# Patient Record
Sex: FEMALE | Employment: FULL TIME
[De-identification: names, ages, dates, MRNs, and addresses within clinical notes are randomized per-mention and may not be internally consistent; named-entity substitution may affect disease eponyms.]

---

## 2014-07-01 LAB — COLONOSCOPY STUDY: NORMAL

## 2017-10-03 ENCOUNTER — IMAGING SERVICES (OUTPATIENT)
Dept: OTHER | Age: 57
End: 2017-10-03

## 2017-10-03 ENCOUNTER — HOSPITAL (OUTPATIENT)
Dept: OTHER | Age: 57
End: 2017-10-03
Attending: FAMILY MEDICINE

## 2017-11-10 ENCOUNTER — IMAGING SERVICES (OUTPATIENT)
Dept: OTHER | Age: 57
End: 2017-11-10

## 2017-11-10 ENCOUNTER — HOSPITAL (OUTPATIENT)
Dept: OTHER | Age: 57
End: 2017-11-10
Attending: FAMILY MEDICINE

## 2017-12-01 ENCOUNTER — HOSPITAL (OUTPATIENT)
Dept: OTHER | Age: 57
End: 2017-12-01
Attending: FAMILY MEDICINE

## 2017-12-11 ENCOUNTER — IMAGING SERVICES (OUTPATIENT)
Dept: OTHER | Age: 57
End: 2017-12-11

## 2017-12-11 ENCOUNTER — HOSPITAL (OUTPATIENT)
Dept: OTHER | Age: 57
End: 2017-12-11
Attending: FAMILY MEDICINE

## 2018-01-15 ENCOUNTER — HOSPITAL ENCOUNTER (OUTPATIENT)
Dept: ULTRASOUND IMAGING | Age: 58
Discharge: HOME | End: 2018-01-15
Attending: OBSTETRICS & GYNECOLOGY
Payer: COMMERCIAL

## 2018-01-15 ENCOUNTER — OFFICE VISIT (OUTPATIENT)
Dept: OBGYN CLINIC | Facility: CLINIC | Age: 58
End: 2018-01-15

## 2018-01-15 ENCOUNTER — LAB ENCOUNTER (OUTPATIENT)
Dept: LAB | Facility: HOSPITAL | Age: 58
End: 2018-01-15
Attending: OBSTETRICS & GYNECOLOGY
Payer: COMMERCIAL

## 2018-01-15 VITALS
DIASTOLIC BLOOD PRESSURE: 70 MMHG | WEIGHT: 145.5 LBS | SYSTOLIC BLOOD PRESSURE: 124 MMHG | HEIGHT: 65 IN | BODY MASS INDEX: 24.24 KG/M2

## 2018-01-15 DIAGNOSIS — N92.1 MENORRHAGIA WITH IRREGULAR CYCLE: ICD-10-CM

## 2018-01-15 DIAGNOSIS — D25.9 UTERINE LEIOMYOMA, UNSPECIFIED LOCATION: ICD-10-CM

## 2018-01-15 DIAGNOSIS — N92.6 IRREGULAR MENSES: ICD-10-CM

## 2018-01-15 DIAGNOSIS — N92.1 MENORRHAGIA WITH IRREGULAR CYCLE: Primary | ICD-10-CM

## 2018-01-15 LAB
BASOPHILS # BLD: 0.1 K/UL (ref 0–0.2)
BASOPHILS NFR BLD: 1 %
EOSINOPHIL # BLD: 0.1 K/UL (ref 0–0.7)
EOSINOPHIL NFR BLD: 1 %
ERYTHROCYTE [DISTWIDTH] IN BLOOD BY AUTOMATED COUNT: 13.4 % (ref 11–15)
HCT VFR BLD AUTO: 37.6 % (ref 35–48)
HGB BLD-MCNC: 12.6 G/DL (ref 12–16)
LYMPHOCYTES # BLD: 1.5 K/UL (ref 1–4)
LYMPHOCYTES NFR BLD: 19 %
MCH RBC QN AUTO: 31.2 PG (ref 27–32)
MCHC RBC AUTO-ENTMCNC: 33.6 G/DL (ref 32–37)
MCV RBC AUTO: 92.8 FL (ref 80–100)
MONOCYTES # BLD: 0.5 K/UL (ref 0–1)
MONOCYTES NFR BLD: 6 %
NEUTROPHILS # BLD AUTO: 5.6 K/UL (ref 1.8–7.7)
NEUTROPHILS NFR BLD: 72 %
PLATELET # BLD AUTO: 344 K/UL (ref 140–400)
PMV BLD AUTO: 8.4 FL (ref 7.4–10.3)
RBC # BLD AUTO: 4.05 M/UL (ref 3.7–5.4)
WBC # BLD AUTO: 7.8 K/UL (ref 4–11)

## 2018-01-15 PROCEDURE — 76856 US EXAM PELVIC COMPLETE: CPT | Performed by: OBSTETRICS & GYNECOLOGY

## 2018-01-15 PROCEDURE — 76830 TRANSVAGINAL US NON-OB: CPT | Performed by: OBSTETRICS & GYNECOLOGY

## 2018-01-15 PROCEDURE — 36415 COLL VENOUS BLD VENIPUNCTURE: CPT

## 2018-01-15 PROCEDURE — 85025 COMPLETE CBC W/AUTO DIFF WBC: CPT

## 2018-01-15 PROCEDURE — 99244 OFF/OP CNSLTJ NEW/EST MOD 40: CPT | Performed by: OBSTETRICS & GYNECOLOGY

## 2018-01-15 RX ORDER — MEDROXYPROGESTERONE ACETATE 10 MG/1
TABLET ORAL
Refills: 0 | COMMUNITY
Start: 2018-01-10 | End: 2018-02-01

## 2018-01-15 RX ORDER — MEDROXYPROGESTERONE ACETATE 10 MG/1
10 TABLET ORAL DAILY
Qty: 30 TABLET | Refills: 1 | Status: SHIPPED | OUTPATIENT
Start: 2018-01-15 | End: 2018-02-07

## 2018-01-15 NOTE — PROGRESS NOTES
HPI:   Alissa Perez is a 56year old female who presents for a hx of 3 weeks of heavy menses,  Had a 2 week episode in November also. Pt called her pcp over the weekend,  Pt had cbc hb 13 last Wednesday.   Pt counseled on w/u/eval/tx of heavy menses Preston Walker is a 56year old female who presents for a consult for hx of heavy menstrual bleeding for 3 weeks,  Had a 2 week episode in November 2017. Pt counseled extensively on possible etiology/w/u/eval/tx , all questions answered.    Pt in exa

## 2018-01-16 ENCOUNTER — TELEPHONE (OUTPATIENT)
Dept: PEDIATRICS CLINIC | Facility: CLINIC | Age: 58
End: 2018-01-16

## 2018-01-16 DIAGNOSIS — N92.6 IRREGULAR MENSTRUAL CYCLE: ICD-10-CM

## 2018-01-16 DIAGNOSIS — N92.1 METRORRHAGIA: ICD-10-CM

## 2018-01-16 DIAGNOSIS — D25.9 UTERINE LEIOMYOMA, UNSPECIFIED LOCATION: Primary | ICD-10-CM

## 2018-01-17 NOTE — TELEPHONE ENCOUNTER
Pt called, requested to speak with Dr. Deejay Hurley nurse. Attempted to transfer call to appropriate office, pt. Given phone number to [facility] office.

## 2018-01-17 NOTE — TELEPHONE ENCOUNTER
Called pt , left message. Pelvic u/s shows 3 fibroids, possible 1 subendometrial fibroid. Pt needs counseling, route to me when she calls back. Will offer Hysterscopy/ myosure myomectomy/d & c. Would consider mirena iud insertion as well if pt desires to help suppress fibroid regrowth.

## 2018-01-17 NOTE — TELEPHONE ENCOUNTER
Pt requesting result, informed of result per ASJ note, pt would like to speak to ASJ.   Can call to [phone] per pt

## 2018-01-18 NOTE — TELEPHONE ENCOUNTER
Pt counseled extensively and pt requested an operative hysteroscopy/myosure myomectomy/d & c/ mirena insertion.   Pt is requesting feb 16 in the am,  Please see if a 730 am is available and call pt tomorrow. e

## 2018-01-19 NOTE — TELEPHONE ENCOUNTER
Patient informed and verbalized understanding of date and time.  Pt is wondering if it needs to be moved up sooner since she is still bleeding, currently taking 20mg of provera a day and still changing 4 pads a day, please advise

## 2018-01-22 NOTE — TELEPHONE ENCOUNTER
Moved it to 2/7 @ 10:15, confirmed with Chanel Bronson (VAN). Dr. Darrion Last, you will be following your self on two cases that morning.   Will a surgical assist work in this case Dr. Darrion Last?

## 2018-01-26 ENCOUNTER — TELEPHONE (OUTPATIENT)
Dept: OBGYN CLINIC | Facility: CLINIC | Age: 58
End: 2018-01-26

## 2018-01-26 NOTE — TELEPHONE ENCOUNTER
Pt informed to keep taking progesterone until surgery to prevent a withdrawal bleed or at least until dr. Ish Larry states a day she should stop taking it.  Dr. Ish Larry please advise

## 2018-01-26 NOTE — TELEPHONE ENCOUNTER
Pt was given medroxyPROGESTERone, would like to know if she should continue taking it? Bleeding stopped. If so, for how long?

## 2018-02-01 RX ORDER — PNV NO.95/FERROUS FUM/FOLIC AC 28MG-0.8MG
TABLET ORAL
COMMUNITY
End: 2018-02-07

## 2018-02-06 ENCOUNTER — TELEPHONE (OUTPATIENT)
Dept: PEDIATRICS CLINIC | Facility: CLINIC | Age: 58
End: 2018-02-06

## 2018-02-06 NOTE — TELEPHONE ENCOUNTER
Pt scheduled for surgery with ASJ  Tomorrow and wanted ASJ to be in formed she began having cold like symptoms on Sunday. Today just having runny/stuffy nose. Denied any body aches, cough or fever.  Would like to know if this would be on problem for surgery

## 2018-02-07 ENCOUNTER — HOSPITAL ENCOUNTER (OUTPATIENT)
Facility: HOSPITAL | Age: 58
Setting detail: HOSPITAL OUTPATIENT SURGERY
Discharge: HOME | End: 2018-02-07
Attending: OBSTETRICS & GYNECOLOGY | Admitting: OBSTETRICS & GYNECOLOGY
Payer: COMMERCIAL

## 2018-02-07 ENCOUNTER — ANESTHESIA EVENT (OUTPATIENT)
Dept: SURGERY | Facility: HOSPITAL | Age: 58
End: 2018-02-07

## 2018-02-07 ENCOUNTER — SURGERY (OUTPATIENT)
Age: 58
End: 2018-02-07

## 2018-02-07 ENCOUNTER — ANESTHESIA (OUTPATIENT)
Dept: SURGERY | Facility: HOSPITAL | Age: 58
End: 2018-02-07

## 2018-02-07 VITALS
OXYGEN SATURATION: 100 % | HEIGHT: 65.5 IN | SYSTOLIC BLOOD PRESSURE: 133 MMHG | TEMPERATURE: 98 F | HEART RATE: 65 BPM | WEIGHT: 144 LBS | RESPIRATION RATE: 16 BRPM | BODY MASS INDEX: 23.7 KG/M2 | DIASTOLIC BLOOD PRESSURE: 76 MMHG

## 2018-02-07 DIAGNOSIS — D25.9 UTERINE LEIOMYOMA, UNSPECIFIED LOCATION: ICD-10-CM

## 2018-02-07 DIAGNOSIS — N92.6 IRREGULAR MENSTRUAL CYCLE: ICD-10-CM

## 2018-02-07 DIAGNOSIS — N92.1 METRORRHAGIA: ICD-10-CM

## 2018-02-07 LAB — B-HCG UR QL: NEGATIVE

## 2018-02-07 PROCEDURE — 0UDB8ZX EXTRACTION OF ENDOMETRIUM, VIA NATURAL OR ARTIFICIAL OPENING ENDOSCOPIC, DIAGNOSTIC: ICD-10-PCS | Performed by: OBSTETRICS & GYNECOLOGY

## 2018-02-07 PROCEDURE — 58120 DILATION AND CURETTAGE: CPT | Performed by: OBSTETRICS & GYNECOLOGY

## 2018-02-07 PROCEDURE — 0UH98HZ INSERTION OF CONTRACEPTIVE DEVICE INTO UTERUS, VIA NATURAL OR ARTIFICIAL OPENING ENDOSCOPIC: ICD-10-PCS | Performed by: OBSTETRICS & GYNECOLOGY

## 2018-02-07 PROCEDURE — 0UB98ZZ EXCISION OF UTERUS, VIA NATURAL OR ARTIFICIAL OPENING ENDOSCOPIC: ICD-10-PCS | Performed by: OBSTETRICS & GYNECOLOGY

## 2018-02-07 PROCEDURE — 99235 HOSP IP/OBS SAME DATE MOD 70: CPT | Performed by: OBSTETRICS & GYNECOLOGY

## 2018-02-07 PROCEDURE — 58558 HYSTEROSCOPY BIOPSY: CPT | Performed by: OBSTETRICS & GYNECOLOGY

## 2018-02-07 PROCEDURE — 58300 INSERT INTRAUTERINE DEVICE: CPT | Performed by: OBSTETRICS & GYNECOLOGY

## 2018-02-07 DEVICE — MIRENA IUD 52MG: Type: IMPLANTABLE DEVICE | Status: FUNCTIONAL

## 2018-02-07 RX ORDER — HYDROCODONE BITARTRATE AND ACETAMINOPHEN 5; 325 MG/1; MG/1
1 TABLET ORAL AS NEEDED
Status: DISCONTINUED | OUTPATIENT
Start: 2018-02-07 | End: 2018-02-07

## 2018-02-07 RX ORDER — LIDOCAINE HYDROCHLORIDE 10 MG/ML
INJECTION, SOLUTION EPIDURAL; INFILTRATION; INTRACAUDAL; PERINEURAL AS NEEDED
Status: DISCONTINUED | OUTPATIENT
Start: 2018-02-07 | End: 2018-02-07 | Stop reason: SURG

## 2018-02-07 RX ORDER — MIDAZOLAM HYDROCHLORIDE 1 MG/ML
INJECTION INTRAMUSCULAR; INTRAVENOUS AS NEEDED
Status: DISCONTINUED | OUTPATIENT
Start: 2018-02-07 | End: 2018-02-07 | Stop reason: SURG

## 2018-02-07 RX ORDER — ACETAMINOPHEN 500 MG
1000 TABLET ORAL ONCE
Status: COMPLETED | OUTPATIENT
Start: 2018-02-07 | End: 2018-02-07

## 2018-02-07 RX ORDER — ONDANSETRON 2 MG/ML
INJECTION INTRAMUSCULAR; INTRAVENOUS AS NEEDED
Status: DISCONTINUED | OUTPATIENT
Start: 2018-02-07 | End: 2018-02-07 | Stop reason: SURG

## 2018-02-07 RX ORDER — ONDANSETRON 2 MG/ML
4 INJECTION INTRAMUSCULAR; INTRAVENOUS ONCE AS NEEDED
Status: DISCONTINUED | OUTPATIENT
Start: 2018-02-07 | End: 2018-02-07

## 2018-02-07 RX ORDER — SODIUM CHLORIDE, SODIUM LACTATE, POTASSIUM CHLORIDE, CALCIUM CHLORIDE 600; 310; 30; 20 MG/100ML; MG/100ML; MG/100ML; MG/100ML
INJECTION, SOLUTION INTRAVENOUS CONTINUOUS
Status: DISCONTINUED | OUTPATIENT
Start: 2018-02-07 | End: 2018-02-07

## 2018-02-07 RX ORDER — MORPHINE SULFATE 2 MG/ML
2 INJECTION, SOLUTION INTRAMUSCULAR; INTRAVENOUS EVERY 10 MIN PRN
Status: DISCONTINUED | OUTPATIENT
Start: 2018-02-07 | End: 2018-02-07

## 2018-02-07 RX ORDER — METOCLOPRAMIDE 10 MG/1
10 TABLET ORAL ONCE
Status: DISCONTINUED | OUTPATIENT
Start: 2018-02-07 | End: 2018-02-07 | Stop reason: HOSPADM

## 2018-02-07 RX ORDER — MORPHINE SULFATE 10 MG/ML
6 INJECTION, SOLUTION INTRAMUSCULAR; INTRAVENOUS EVERY 10 MIN PRN
Status: DISCONTINUED | OUTPATIENT
Start: 2018-02-07 | End: 2018-02-07

## 2018-02-07 RX ORDER — MORPHINE SULFATE 4 MG/ML
4 INJECTION, SOLUTION INTRAMUSCULAR; INTRAVENOUS EVERY 10 MIN PRN
Status: DISCONTINUED | OUTPATIENT
Start: 2018-02-07 | End: 2018-02-07

## 2018-02-07 RX ORDER — DEXAMETHASONE SODIUM PHOSPHATE 4 MG/ML
VIAL (ML) INJECTION AS NEEDED
Status: DISCONTINUED | OUTPATIENT
Start: 2018-02-07 | End: 2018-02-07 | Stop reason: SURG

## 2018-02-07 RX ORDER — NALOXONE HYDROCHLORIDE 0.4 MG/ML
80 INJECTION, SOLUTION INTRAMUSCULAR; INTRAVENOUS; SUBCUTANEOUS AS NEEDED
Status: DISCONTINUED | OUTPATIENT
Start: 2018-02-07 | End: 2018-02-07

## 2018-02-07 RX ORDER — HYDROCODONE BITARTRATE AND ACETAMINOPHEN 5; 325 MG/1; MG/1
2 TABLET ORAL AS NEEDED
Status: DISCONTINUED | OUTPATIENT
Start: 2018-02-07 | End: 2018-02-07

## 2018-02-07 RX ORDER — FAMOTIDINE 20 MG/1
20 TABLET ORAL ONCE
Status: DISCONTINUED | OUTPATIENT
Start: 2018-02-07 | End: 2018-02-07 | Stop reason: HOSPADM

## 2018-02-07 RX ADMIN — LIDOCAINE HYDROCHLORIDE 50 MG: 10 INJECTION, SOLUTION EPIDURAL; INFILTRATION; INTRACAUDAL; PERINEURAL at 10:48:00

## 2018-02-07 RX ADMIN — DEXAMETHASONE SODIUM PHOSPHATE 4 MG: 4 MG/ML VIAL (ML) INJECTION at 10:48:00

## 2018-02-07 RX ADMIN — SODIUM CHLORIDE, SODIUM LACTATE, POTASSIUM CHLORIDE, CALCIUM CHLORIDE: 600; 310; 30; 20 INJECTION, SOLUTION INTRAVENOUS at 11:30:00

## 2018-02-07 RX ADMIN — SODIUM CHLORIDE, SODIUM LACTATE, POTASSIUM CHLORIDE, CALCIUM CHLORIDE: 600; 310; 30; 20 INJECTION, SOLUTION INTRAVENOUS at 10:48:00

## 2018-02-07 RX ADMIN — MIDAZOLAM HYDROCHLORIDE 2 MG: 1 INJECTION INTRAMUSCULAR; INTRAVENOUS at 10:48:00

## 2018-02-07 RX ADMIN — ONDANSETRON 4 MG: 2 INJECTION INTRAMUSCULAR; INTRAVENOUS at 10:48:00

## 2018-02-07 NOTE — ANESTHESIA POSTPROCEDURE EVALUATION
Patient: Aida Jorgensen    Procedure Summary     Date:  02/07/18 Room / Location:  [address] [place] [address] MAIN OR 01 / [address] [place] [address] MAIN OR    Anesthesia Start:  9810 Anesthesia Stop:      Procedure:  MYOMECTOMY HYSTEROSCOPIC (N/A Vagina ) Diagnosis:       Irregular menstrual c

## 2018-02-07 NOTE — H&P
Alina Ann is a 56year old female who presents for a hx of 3 weeks of heavy menses,  Had a 2 week episode in November also. Pt called her pcp over the weekend,  Pt had cbc hb 13 last Wednesday.   Pt counseled on w/u/eval/tx of heavy menses, pt st exertion  CARDIOVASCULAR: denies chest pain on exertion  GI: denies abdominal pain,denies heartburn  : denies dysuria, vaginal discharge or itching,periods regular   MUSCULOSKELETAL: denies back pain  NEURO: denies headaches  PSYCHE: denies depression or

## 2018-02-07 NOTE — OPERATIVE REPORT
[facility]    PATIENT'S NAME: Iker Hawley   ATTENDING PHYSICIAN: Kem Spain MD   OPERATING PHYSICIAN: Ashok Claude A. Granja do Tedo, MD   PATIENT ACCOUNT#:   [de-identified]    LOCATION:  [address]  MEDICAL RECORD #:   [id number] preop pregnancy test today was negative. A weighted speculum was placed in the vagina. The cervix was fully visualized. A single-tooth tenaculum was placed on the anterior lip of the cervix.   The cervix was gently dilated after which the 7 mm MyoSure sc

## 2018-02-07 NOTE — ANESTHESIA PREPROCEDURE EVALUATION
Anesthesia PreOp Note    HPI:     Abimael Huerta is a 56year old female who presents for preoperative consultation requested by: Katarina Parikh MD    Date of Surgery: 2/7/2018    Procedure(s):   MYOMECTOMY HYSTEROSCOPIC  Indication: Irregular m No    Sexual activity: Not on file     Other Topics Concern   None on file     Social History Narrative   None on file       Available pre-op labs reviewed.     Lab Results  Component Value Date   WBC 7.8 01/15/2018   RBC 4.05 01/15/2018   HGB 12.6 01/15/20

## 2018-05-14 ENCOUNTER — APPOINTMENT (OUTPATIENT)
Dept: GENERAL RADIOLOGY | Facility: HOSPITAL | Age: 58
End: 2018-05-14
Attending: EMERGENCY MEDICINE
Payer: COMMERCIAL

## 2018-05-14 ENCOUNTER — HOSPITAL ENCOUNTER (EMERGENCY)
Facility: HOSPITAL | Age: 58
Discharge: HOME | End: 2018-05-14
Attending: EMERGENCY MEDICINE
Payer: COMMERCIAL

## 2018-05-14 VITALS
BODY MASS INDEX: 23.66 KG/M2 | HEART RATE: 58 BPM | OXYGEN SATURATION: 99 % | HEIGHT: 65 IN | TEMPERATURE: 98 F | WEIGHT: 142 LBS | SYSTOLIC BLOOD PRESSURE: 148 MMHG | DIASTOLIC BLOOD PRESSURE: 78 MMHG | RESPIRATION RATE: 16 BRPM

## 2018-05-14 DIAGNOSIS — M54.12 CERVICAL RADICULOPATHY: Primary | ICD-10-CM

## 2018-05-14 PROCEDURE — 96372 THER/PROPH/DIAG INJ SC/IM: CPT

## 2018-05-14 PROCEDURE — 72040 X-RAY EXAM NECK SPINE 2-3 VW: CPT | Performed by: EMERGENCY MEDICINE

## 2018-05-14 PROCEDURE — 99283 EMERGENCY DEPT VISIT LOW MDM: CPT

## 2018-05-14 RX ORDER — PREDNISONE 20 MG/1
60 TABLET ORAL DAILY
Qty: 12 TABLET | Refills: 0 | Status: SHIPPED | OUTPATIENT
Start: 2018-05-14 | End: 2018-05-18

## 2018-05-14 RX ORDER — TRAMADOL HYDROCHLORIDE 50 MG/1
50 TABLET ORAL EVERY 8 HOURS PRN
Qty: 15 TABLET | Refills: 0 | Status: SHIPPED | OUTPATIENT
Start: 2018-05-14 | End: 2018-05-19

## 2018-05-14 RX ORDER — MELOXICAM 7.5 MG/1
7.5 TABLET ORAL DAILY
Qty: 14 TABLET | Refills: 0 | Status: SHIPPED | OUTPATIENT
Start: 2018-05-14 | End: 2018-05-28

## 2018-05-14 RX ORDER — KETOROLAC TROMETHAMINE 30 MG/ML
30 INJECTION, SOLUTION INTRAMUSCULAR; INTRAVENOUS ONCE
Status: COMPLETED | OUTPATIENT
Start: 2018-05-14 | End: 2018-05-14

## 2018-05-14 RX ORDER — PREDNISONE 20 MG/1
60 TABLET ORAL ONCE
Status: COMPLETED | OUTPATIENT
Start: 2018-05-14 | End: 2018-05-14

## 2018-05-14 NOTE — ED PROVIDER NOTES
Patient Seen in: [facility] AND [facility] Emergency Department    History   Patient presents with:  Musculoskeletal Problem    Stated Complaint: Right shoulder injury; hand injuiry     HPI    65 yo F without PMH presenting for evaluation of one day of right later (36.7 °C) (Oral)   Resp 18   Ht 165.1 cm (5' 5\")   Wt 64.4 kg   SpO2 99%   BMI 23.63 kg/m²         Physical Exam   Constitutional: No distress. HEENT: MMM. Head: Normocephalic. Atraumatic. Eyes: No injection. Neck: Neck supple.  No midline cervical t neurovascular compromise, no midline tenderness, pain improved with laying flat.  Radiography obtained given prior/transient symptoms and nonacute - no indication for further emmergent/advanced neuroimaging given lack of midline tenderness or red flag sympt

## 2018-10-06 ENCOUNTER — APPOINTMENT (OUTPATIENT)
Dept: GENERAL RADIOLOGY | Facility: HOSPITAL | Age: 58
End: 2018-10-06
Payer: COMMERCIAL

## 2018-10-06 ENCOUNTER — HOSPITAL ENCOUNTER (EMERGENCY)
Facility: HOSPITAL | Age: 58
Discharge: HOME | End: 2018-10-06
Payer: COMMERCIAL

## 2018-10-06 VITALS
BODY MASS INDEX: 23.7 KG/M2 | WEIGHT: 144 LBS | TEMPERATURE: 99 F | DIASTOLIC BLOOD PRESSURE: 86 MMHG | RESPIRATION RATE: 18 BRPM | HEART RATE: 86 BPM | OXYGEN SATURATION: 99 % | SYSTOLIC BLOOD PRESSURE: 168 MMHG | HEIGHT: 65.5 IN

## 2018-10-06 DIAGNOSIS — S92.512A CLOSED DISPLACED FRACTURE OF PROXIMAL PHALANX OF LESSER TOE OF LEFT FOOT, INITIAL ENCOUNTER: Primary | ICD-10-CM

## 2018-10-06 PROCEDURE — 99283 EMERGENCY DEPT VISIT LOW MDM: CPT

## 2018-10-06 PROCEDURE — 73660 X-RAY EXAM OF TOE(S): CPT

## 2018-10-06 NOTE — ED PROVIDER NOTES
Patient Seen in: [facility] Emergency Department    History   CC: toe pain  HPI: Ruth Ann Dominguez 66year old female  who presents to the ER c/o left fifth toe pain status post injury in which patient states she was in a crowded kitchen and sh to the left fifth toe without open wound.   Skin is pink warm and dry throughout, mmm, cap refill <2seconds distal left toes  Neuro - A&O x4, sensation equal to both medial and lateral aspects of left toes, steady gait  MSK - makes purposeful movements of a in the emergency department. Patient demonstrates understanding of all instruction and agrees with plan of care.     Disposition and Plan     Clinical Impression:  Closed displaced fracture of proximal phalanx of lesser toe of left foot, initial encounter

## 2019-03-21 ENCOUNTER — TELEPHONE (OUTPATIENT)
Dept: OBGYN CLINIC | Facility: CLINIC | Age: 59
End: 2019-03-21

## 2019-04-04 ENCOUNTER — HOSPITAL (OUTPATIENT)
Dept: OTHER | Age: 59
End: 2019-04-04
Attending: FAMILY MEDICINE

## 2019-04-30 ENCOUNTER — OFFICE VISIT (OUTPATIENT)
Dept: OBGYN CLINIC | Facility: CLINIC | Age: 59
End: 2019-04-30
Payer: COMMERCIAL

## 2019-04-30 VITALS — SYSTOLIC BLOOD PRESSURE: 130 MMHG | DIASTOLIC BLOOD PRESSURE: 72 MMHG | WEIGHT: 150 LBS | BODY MASS INDEX: 25 KG/M2

## 2019-04-30 DIAGNOSIS — N94.19 DYSPAREUNIA DUE TO MEDICAL CONDITION IN FEMALE: ICD-10-CM

## 2019-04-30 DIAGNOSIS — M62.89 PELVIC FLOOR DYSFUNCTION: ICD-10-CM

## 2019-04-30 DIAGNOSIS — Z30.431 IUD CHECK UP: ICD-10-CM

## 2019-04-30 DIAGNOSIS — B37.3 VULVOVAGINITIS DUE TO YEAST: ICD-10-CM

## 2019-04-30 DIAGNOSIS — Z01.411 ENCOUNTER FOR GYNECOLOGICAL EXAMINATION WITH ABNORMAL FINDING: Primary | ICD-10-CM

## 2019-04-30 LAB
CYTOLOGY CVX/VAG DOC THIN PREP: NORMAL
HPV16+18+45 E6+E7MRNA CVX NAA+PROBE: NEGATIVE

## 2019-04-30 PROCEDURE — 99214 OFFICE O/P EST MOD 30 MIN: CPT | Performed by: OBSTETRICS & GYNECOLOGY

## 2019-04-30 PROCEDURE — 99396 PREV VISIT EST AGE 40-64: CPT | Performed by: OBSTETRICS & GYNECOLOGY

## 2019-04-30 RX ORDER — FLUCONAZOLE 150 MG/1
150 TABLET ORAL ONCE
Qty: 5 TABLET | Refills: 0 | Status: SHIPPED | OUTPATIENT
Start: 2019-04-30 | End: 2019-04-30

## 2019-04-30 NOTE — PROGRESS NOTES
HPI:   Garrett Douglas is a 64year old female who presents for a hx of dyspareunia for 3 months,  Stated usually no pain but when her  travels for weeks, and then resumes intercourse, has discomfort.     Pt also needs a pap smear, last one over allergy or asthma    EXAM:   /72 (BP Location: Right arm, Patient Position: Sitting, Cuff Size: adult)   Wt 150 lb (68 kg)   Breastfeeding? No   BMI 24.58 kg/m²   Body mass index is 24.58 kg/m².    GENERAL: well developed, well nourished,in no apparen spent in face to face counseling.

## 2019-05-07 PROBLEM — Z30.431 IUD CHECK UP: Status: ACTIVE | Noted: 2019-05-07

## 2019-05-07 PROBLEM — N94.19 DYSPAREUNIA DUE TO MEDICAL CONDITION IN FEMALE: Status: ACTIVE | Noted: 2019-05-07

## 2019-05-07 PROBLEM — M62.89 PELVIC FLOOR DYSFUNCTION: Status: ACTIVE | Noted: 2019-05-07

## 2019-10-08 ENCOUNTER — WALK IN (OUTPATIENT)
Dept: URGENT CARE | Age: 59
End: 2019-10-08

## 2019-10-08 DIAGNOSIS — Z23 NEED FOR IMMUNIZATION AGAINST INFLUENZA: Primary | ICD-10-CM

## 2019-10-08 PROCEDURE — 90471 IMMUNIZATION ADMIN: CPT | Performed by: NURSE PRACTITIONER

## 2019-10-08 PROCEDURE — 90686 IIV4 VACC NO PRSV 0.5 ML IM: CPT | Performed by: NURSE PRACTITIONER

## 2019-10-08 RX ORDER — FERROUS SULFATE 325(65) MG
325 TABLET ORAL
COMMUNITY
Start: 2014-08-01 | End: 2022-05-09 | Stop reason: ALTCHOICE

## 2019-10-08 RX ORDER — ASPIRIN 81 MG/1
81 TABLET, CHEWABLE ORAL
COMMUNITY
End: 2022-05-10

## 2019-10-08 RX ORDER — GABAPENTIN 300 MG/1
CAPSULE ORAL
COMMUNITY
Start: 2018-09-20 | End: 2022-05-10

## 2019-10-08 RX ORDER — CHOLECALCIFEROL (VITAMIN D3) 1250 MCG
CAPSULE ORAL
COMMUNITY
End: 2022-05-09 | Stop reason: ALTCHOICE

## 2019-11-11 ENCOUNTER — TELEPHONE (OUTPATIENT)
Dept: OBGYN CLINIC | Facility: CLINIC | Age: 59
End: 2019-11-11

## 2019-11-11 NOTE — TELEPHONE ENCOUNTER
Pt with multiple questions regarding IUD. Advised pt she should just schedule a consult with asj to discuss her questions and concerns. Pt voices understanding.  Pt scheduled for consult with asj on 11/21/19 at 2:40 pm.

## 2019-11-11 NOTE — TELEPHONE ENCOUNTER
Called pt , left message. Pt received her mirena due to having significant menorrhagia, and it was placed during her hysteroscopic procedure. If pt wants removal, may schedule in office. t

## 2019-11-11 NOTE — TELEPHONE ENCOUNTER
Pt Name and  verified. Pt states that she had IUD placed back in 2018. Pt would like to know what the indication for the IUD placement was. States that she doesn't seem to comprehend why she needs it at this point after the procedure she had. Would like ASJ to contact her in regards to the need for IUD and if it's possible for her to just have it removed.  pls advise

## 2019-11-21 ENCOUNTER — APPOINTMENT (OUTPATIENT)
Dept: LAB | Facility: HOSPITAL | Age: 59
End: 2019-11-21
Attending: OBSTETRICS & GYNECOLOGY
Payer: COMMERCIAL

## 2019-11-21 ENCOUNTER — OFFICE VISIT (OUTPATIENT)
Dept: OBGYN CLINIC | Facility: CLINIC | Age: 59
End: 2019-11-21
Payer: COMMERCIAL

## 2019-11-21 VITALS — SYSTOLIC BLOOD PRESSURE: 112 MMHG | WEIGHT: 153.19 LBS | DIASTOLIC BLOOD PRESSURE: 70 MMHG | BODY MASS INDEX: 25 KG/M2

## 2019-11-21 DIAGNOSIS — N91.2 AMENORRHEA: ICD-10-CM

## 2019-11-21 DIAGNOSIS — Z97.5 IUD (INTRAUTERINE DEVICE) IN PLACE: ICD-10-CM

## 2019-11-21 DIAGNOSIS — R63.5 WEIGHT GAIN: ICD-10-CM

## 2019-11-21 DIAGNOSIS — N91.2 AMENORRHEA: Primary | ICD-10-CM

## 2019-11-21 PROCEDURE — 84443 ASSAY THYROID STIM HORMONE: CPT

## 2019-11-21 PROCEDURE — 36415 COLL VENOUS BLD VENIPUNCTURE: CPT

## 2019-11-21 PROCEDURE — 99214 OFFICE O/P EST MOD 30 MIN: CPT | Performed by: OBSTETRICS & GYNECOLOGY

## 2019-11-21 PROCEDURE — 83001 ASSAY OF GONADOTROPIN (FSH): CPT

## 2019-11-21 NOTE — PROGRESS NOTES
HPI:   Dank Fernandez is a 64year old female who presents for a history of abnormal/heavy menses in 2018 patient was status post operative hysteroscopy with polypectomy and myomectomy which showed benign results back then the patient at that time in Never Smoker      Smokeless tobacco: Never Used    Alcohol use: Yes      Comment: rarely    Drug use: No           REVIEW OF SYSTEMS:   GENERAL: feels well otherwise  SKIN: denies any unusual skin lesions  EYES:denies blurred vision or double vision  HEENT order for mammogram at [facility] since she has been getting her mammograms at a [facility] facility. Patient stated her primary care doctor is in the [facility] as well. Well woman counseling.   Pt in exam room/seen/counseled/reviewed labs and res

## 2020-08-19 PROBLEM — M77.8 LEFT WRIST TENDONITIS: Status: ACTIVE | Noted: 2020-08-19

## 2020-08-19 PROBLEM — M65.4 TENOSYNOVITIS, DE QUERVAIN: Status: ACTIVE | Noted: 2020-08-19

## 2020-12-15 ENCOUNTER — TELEPHONE (OUTPATIENT)
Dept: SCHEDULING | Age: 60
End: 2020-12-15

## 2021-02-15 ENCOUNTER — TELEPHONE (OUTPATIENT)
Dept: SCHEDULING | Age: 61
End: 2021-02-15

## 2021-02-17 ENCOUNTER — OFFICE VISIT (OUTPATIENT)
Dept: INTERNAL MEDICINE | Age: 61
End: 2021-02-17

## 2021-02-17 DIAGNOSIS — Z00.00 ANNUAL PHYSICAL EXAM: ICD-10-CM

## 2021-02-17 DIAGNOSIS — N64.4 BREAST PAIN, LEFT: Primary | ICD-10-CM

## 2021-02-17 PROCEDURE — 99204 OFFICE O/P NEW MOD 45 MIN: CPT | Performed by: INTERNAL MEDICINE

## 2021-02-17 SDOH — HEALTH STABILITY: PHYSICAL HEALTH: ON AVERAGE, HOW MANY DAYS PER WEEK DO YOU ENGAGE IN MODERATE TO STRENUOUS EXERCISE (LIKE A BRISK WALK)?: 4 DAYS

## 2021-02-17 SDOH — HEALTH STABILITY: MENTAL HEALTH: HOW OFTEN DO YOU HAVE A DRINK CONTAINING ALCOHOL?: NEVER

## 2021-02-17 SDOH — HEALTH STABILITY: MENTAL HEALTH
STRESS IS WHEN SOMEONE FEELS TENSE, NERVOUS, ANXIOUS, OR CAN'T SLEEP AT NIGHT BECAUSE THEIR MIND IS TROUBLED. HOW STRESSED ARE YOU?: NOT AT ALL

## 2021-02-17 SDOH — HEALTH STABILITY: PHYSICAL HEALTH: ON AVERAGE, HOW MANY MINUTES DO YOU ENGAGE IN EXERCISE AT THIS LEVEL?: 30 MIN

## 2021-02-17 ASSESSMENT — PATIENT HEALTH QUESTIONNAIRE - PHQ9
2. FEELING DOWN, DEPRESSED OR HOPELESS: NOT AT ALL
CLINICAL INTERPRETATION OF PHQ2 SCORE: NO FURTHER SCREENING NEEDED
SUM OF ALL RESPONSES TO PHQ9 QUESTIONS 1 AND 2: 0
SUM OF ALL RESPONSES TO PHQ9 QUESTIONS 1 AND 2: 0
CLINICAL INTERPRETATION OF PHQ9 SCORE: NO FURTHER SCREENING NEEDED
1. LITTLE INTEREST OR PLEASURE IN DOING THINGS: NOT AT ALL

## 2021-02-17 ASSESSMENT — PAIN SCALES - GENERAL: PAINLEVEL: 0

## 2021-02-18 ENCOUNTER — TELEPHONE (OUTPATIENT)
Dept: SCHEDULING | Age: 61
End: 2021-02-18

## 2021-02-22 ENCOUNTER — IMAGING SERVICES (OUTPATIENT)
Dept: GENERAL RADIOLOGY | Age: 61
End: 2021-02-22
Attending: NURSE PRACTITIONER

## 2021-02-22 ENCOUNTER — OFFICE VISIT (OUTPATIENT)
Dept: URGENT CARE | Age: 61
End: 2021-02-22

## 2021-02-22 VITALS
TEMPERATURE: 97.5 F | HEART RATE: 63 BPM | SYSTOLIC BLOOD PRESSURE: 157 MMHG | OXYGEN SATURATION: 100 % | DIASTOLIC BLOOD PRESSURE: 91 MMHG | RESPIRATION RATE: 17 BRPM

## 2021-02-22 DIAGNOSIS — M79.672 LEFT FOOT PAIN: ICD-10-CM

## 2021-02-22 DIAGNOSIS — T14.90XA TRAUMA: ICD-10-CM

## 2021-02-22 DIAGNOSIS — T14.90XA TRAUMA: Primary | ICD-10-CM

## 2021-02-22 PROCEDURE — 99203 OFFICE O/P NEW LOW 30 MIN: CPT | Performed by: NURSE PRACTITIONER

## 2021-02-22 PROCEDURE — 73630 X-RAY EXAM OF FOOT: CPT | Performed by: NURSE PRACTITIONER

## 2021-02-22 ASSESSMENT — ENCOUNTER SYMPTOMS
HEMATOLOGIC/LYMPHATIC NEGATIVE: 1
PSYCHIATRIC NEGATIVE: 1
ALLERGIC/IMMUNOLOGIC NEGATIVE: 1
ENDOCRINE NEGATIVE: 1
NEUROLOGICAL NEGATIVE: 1
EYES NEGATIVE: 1
GASTROINTESTINAL NEGATIVE: 1
RESPIRATORY NEGATIVE: 1
CONSTITUTIONAL NEGATIVE: 1

## 2021-02-23 ENCOUNTER — TELEPHONE (OUTPATIENT)
Dept: MAMMOGRAPHY | Age: 61
End: 2021-02-23

## 2021-02-24 ENCOUNTER — HOSPITAL ENCOUNTER (OUTPATIENT)
Dept: MAMMOGRAPHY | Facility: HOSPITAL | Age: 61
Discharge: HOME | End: 2021-02-24
Attending: INTERNAL MEDICINE
Payer: COMMERCIAL

## 2021-02-24 DIAGNOSIS — N64.4 PAINFUL BREASTS: ICD-10-CM

## 2021-02-24 LAB — HM MAMMOGRAPHY BILATERAL: NORMAL

## 2021-02-24 PROCEDURE — 77062 BREAST TOMOSYNTHESIS BI: CPT | Performed by: INTERNAL MEDICINE

## 2021-02-24 PROCEDURE — 77066 DX MAMMO INCL CAD BI: CPT | Performed by: INTERNAL MEDICINE

## 2021-03-05 ENCOUNTER — APPOINTMENT (OUTPATIENT)
Dept: ULTRASOUND IMAGING | Age: 61
End: 2021-03-05
Attending: INTERNAL MEDICINE

## 2021-03-05 ENCOUNTER — APPOINTMENT (OUTPATIENT)
Dept: MAMMOGRAPHY | Age: 61
End: 2021-03-05
Attending: INTERNAL MEDICINE

## 2021-05-26 VITALS
OXYGEN SATURATION: 100 % | DIASTOLIC BLOOD PRESSURE: 73 MMHG | HEIGHT: 65 IN | TEMPERATURE: 96.1 F | BODY MASS INDEX: 25.71 KG/M2 | WEIGHT: 154.32 LBS | RESPIRATION RATE: 16 BRPM | SYSTOLIC BLOOD PRESSURE: 122 MMHG | HEART RATE: 81 BPM

## 2021-07-14 DIAGNOSIS — Z12.11 COLON CANCER SCREENING: Primary | ICD-10-CM

## 2021-08-11 NOTE — TELEPHONE ENCOUNTER
How Severe Are Your Spot(S)?: mild Rt call  would like to discuss What Type Of Note Output Would You Prefer (Optional)?: Bullet Format What Is The Reason For Today's Visit?: Annual Full Body Skin Examination with No Concerns What Is The Reason For Today's Visit? (Being Monitored For X): surveillance against the recurrence of atypical nevi

## 2021-08-12 ENCOUNTER — IMAGING SERVICES (OUTPATIENT)
Dept: GENERAL RADIOLOGY | Age: 61
End: 2021-08-12
Attending: FAMILY MEDICINE

## 2021-08-12 ENCOUNTER — OFFICE VISIT (OUTPATIENT)
Dept: URGENT CARE | Age: 61
End: 2021-08-12

## 2021-08-12 VITALS
TEMPERATURE: 97.4 F | OXYGEN SATURATION: 98 % | SYSTOLIC BLOOD PRESSURE: 144 MMHG | DIASTOLIC BLOOD PRESSURE: 74 MMHG | RESPIRATION RATE: 16 BRPM | HEART RATE: 63 BPM

## 2021-08-12 DIAGNOSIS — M79.675 PAIN IN TOE OF LEFT FOOT: ICD-10-CM

## 2021-08-12 DIAGNOSIS — M79.675 PAIN IN TOE OF LEFT FOOT: Primary | ICD-10-CM

## 2021-08-12 PROCEDURE — 73630 X-RAY EXAM OF FOOT: CPT | Performed by: FAMILY MEDICINE

## 2021-08-12 PROCEDURE — 99213 OFFICE O/P EST LOW 20 MIN: CPT | Performed by: FAMILY MEDICINE

## 2021-12-14 ENCOUNTER — TELEPHONE (OUTPATIENT)
Dept: SCHEDULING | Age: 61
End: 2021-12-14

## 2021-12-15 ENCOUNTER — TELEPHONE (OUTPATIENT)
Dept: OBGYN CLINIC | Facility: CLINIC | Age: 61
End: 2021-12-15

## 2021-12-15 NOTE — TELEPHONE ENCOUNTER
Patient name and  verified. Patient asking Mirena insertion date (2018). Patient asking how long she should have IUD for since she was using it for fibroids and now she thinks she is going through menopause.  She is complaining of hot flushes, in

## 2021-12-15 NOTE — TELEPHONE ENCOUNTER
Pt would like to know the date she had her IUD inserted, and unsure if shes needing it removed.  Please advise

## 2021-12-21 ENCOUNTER — OFFICE VISIT (OUTPATIENT)
Dept: FAMILY MEDICINE | Age: 61
End: 2021-12-21

## 2021-12-21 VITALS
WEIGHT: 155.55 LBS | DIASTOLIC BLOOD PRESSURE: 79 MMHG | BODY MASS INDEX: 25 KG/M2 | HEIGHT: 66 IN | TEMPERATURE: 98.6 F | RESPIRATION RATE: 16 BRPM | SYSTOLIC BLOOD PRESSURE: 123 MMHG | HEART RATE: 69 BPM

## 2021-12-21 DIAGNOSIS — Z13.820 SCREENING FOR OSTEOPOROSIS: ICD-10-CM

## 2021-12-21 DIAGNOSIS — E78.5 DYSLIPIDEMIA: ICD-10-CM

## 2021-12-21 DIAGNOSIS — R92.2 DENSE BREASTS: ICD-10-CM

## 2021-12-21 DIAGNOSIS — Z82.3 FAMILY HISTORY OF STROKE: ICD-10-CM

## 2021-12-21 DIAGNOSIS — Z12.39 SCREENING BREAST EXAMINATION: ICD-10-CM

## 2021-12-21 DIAGNOSIS — Z12.39 BREAST CANCER SCREENING OTHER THAN MAMMOGRAM: ICD-10-CM

## 2021-12-21 DIAGNOSIS — Z00.00 ANNUAL PHYSICAL EXAM: Primary | ICD-10-CM

## 2021-12-21 DIAGNOSIS — Z23 NEED FOR SHINGLES VACCINE: ICD-10-CM

## 2021-12-21 DIAGNOSIS — R92.30 DENSE BREASTS: ICD-10-CM

## 2021-12-21 DIAGNOSIS — Z11.59 NEED FOR HEPATITIS C SCREENING TEST: ICD-10-CM

## 2021-12-21 PROBLEM — M77.8 LEFT WRIST TENDONITIS: Status: ACTIVE | Noted: 2020-08-19

## 2021-12-21 PROBLEM — Z30.431 IUD CHECK UP: Status: ACTIVE | Noted: 2019-05-07

## 2021-12-21 PROBLEM — D25.9 UTERINE LEIOMYOMA: Status: ACTIVE | Noted: 2018-01-15

## 2021-12-21 PROBLEM — M65.4 TENOSYNOVITIS, DE QUERVAIN: Status: ACTIVE | Noted: 2020-08-19

## 2021-12-21 LAB
BASOPHILS # BLD: 0.1 K/MCL (ref 0–0.3)
BASOPHILS NFR BLD: 1 %
DEPRECATED RDW RBC: 41.2 FL (ref 39–50)
EOSINOPHIL # BLD: 0.1 K/MCL (ref 0–0.5)
EOSINOPHIL NFR BLD: 1 %
ERYTHROCYTE [DISTWIDTH] IN BLOOD: 12 % (ref 11–15)
HCT VFR BLD CALC: 43.5 % (ref 36–46.5)
HGB BLD-MCNC: 14 G/DL (ref 12–15.5)
IMM GRANULOCYTES # BLD AUTO: 0 K/MCL (ref 0–0.2)
IMM GRANULOCYTES # BLD: 0 %
LYMPHOCYTES # BLD: 1.6 K/MCL (ref 1–4)
LYMPHOCYTES NFR BLD: 19 %
MCH RBC QN AUTO: 29.7 PG (ref 26–34)
MCHC RBC AUTO-ENTMCNC: 32.2 G/DL (ref 32–36.5)
MCV RBC AUTO: 92.2 FL (ref 78–100)
MONOCYTES # BLD: 0.5 K/MCL (ref 0.3–0.9)
MONOCYTES NFR BLD: 6 %
NEUTROPHILS # BLD: 6.2 K/MCL (ref 1.8–7.7)
NEUTROPHILS NFR BLD: 73 %
NRBC BLD MANUAL-RTO: 0 /100 WBC
PLATELET # BLD AUTO: 368 K/MCL (ref 140–450)
RBC # BLD: 4.72 MIL/MCL (ref 4–5.2)
WBC # BLD: 8.4 K/MCL (ref 4.2–11)

## 2021-12-21 PROCEDURE — 86803 HEPATITIS C AB TEST: CPT | Performed by: PSYCHIATRY & NEUROLOGY

## 2021-12-21 PROCEDURE — 90750 HZV VACC RECOMBINANT IM: CPT | Performed by: FAMILY MEDICINE

## 2021-12-21 PROCEDURE — 99396 PREV VISIT EST AGE 40-64: CPT | Performed by: FAMILY MEDICINE

## 2021-12-21 PROCEDURE — 80061 LIPID PANEL: CPT | Performed by: PSYCHIATRY & NEUROLOGY

## 2021-12-21 PROCEDURE — 80050 GENERAL HEALTH PANEL: CPT | Performed by: PSYCHIATRY & NEUROLOGY

## 2021-12-21 PROCEDURE — 90471 IMMUNIZATION ADMIN: CPT | Performed by: FAMILY MEDICINE

## 2021-12-21 ASSESSMENT — PATIENT HEALTH QUESTIONNAIRE - PHQ9
SUM OF ALL RESPONSES TO PHQ9 QUESTIONS 1 AND 2: 0
CLINICAL INTERPRETATION OF PHQ2 SCORE: NO FURTHER SCREENING NEEDED
SUM OF ALL RESPONSES TO PHQ9 QUESTIONS 1 AND 2: 0
2. FEELING DOWN, DEPRESSED OR HOPELESS: NOT AT ALL
1. LITTLE INTEREST OR PLEASURE IN DOING THINGS: NOT AT ALL

## 2021-12-22 PROBLEM — Z12.39 SCREENING BREAST EXAMINATION: Status: ACTIVE | Noted: 2021-12-22

## 2021-12-22 PROBLEM — R92.30 DENSE BREASTS: Status: ACTIVE | Noted: 2021-12-22

## 2021-12-22 PROBLEM — Z12.39 BREAST CANCER SCREENING OTHER THAN MAMMOGRAM: Status: ACTIVE | Noted: 2021-12-22

## 2021-12-22 PROBLEM — Z00.00 ANNUAL PHYSICAL EXAM: Status: ACTIVE | Noted: 2021-12-22

## 2021-12-22 PROBLEM — Z23 NEED FOR SHINGLES VACCINE: Status: ACTIVE | Noted: 2021-12-22

## 2021-12-22 PROBLEM — R92.2 DENSE BREASTS: Status: ACTIVE | Noted: 2021-12-22

## 2021-12-22 PROBLEM — Z13.820 SCREENING FOR OSTEOPOROSIS: Status: ACTIVE | Noted: 2021-12-22

## 2021-12-22 PROBLEM — Z11.59 NEED FOR HEPATITIS C SCREENING TEST: Status: ACTIVE | Noted: 2021-12-22

## 2021-12-22 LAB
ALBUMIN SERPL-MCNC: 4 G/DL (ref 3.6–5.1)
ALBUMIN/GLOB SERPL: 1.1 {RATIO} (ref 1–2.4)
ALP SERPL-CCNC: 129 UNITS/L (ref 45–117)
ALT SERPL-CCNC: 30 UNITS/L
ANION GAP SERPL CALC-SCNC: 14 MMOL/L (ref 10–20)
AST SERPL-CCNC: 26 UNITS/L
BILIRUB SERPL-MCNC: 0.5 MG/DL (ref 0.2–1)
BUN SERPL-MCNC: 12 MG/DL (ref 6–20)
BUN/CREAT SERPL: 15 (ref 7–25)
CALCIUM SERPL-MCNC: 9.8 MG/DL (ref 8.4–10.2)
CHLORIDE SERPL-SCNC: 106 MMOL/L (ref 98–107)
CHOLEST SERPL-MCNC: 269 MG/DL
CHOLEST/HDLC SERPL: 4 {RATIO}
CO2 SERPL-SCNC: 25 MMOL/L (ref 21–32)
CREAT SERPL-MCNC: 0.82 MG/DL (ref 0.51–0.95)
FASTING DURATION TIME PATIENT: ABNORMAL H
FASTING DURATION TIME PATIENT: ABNORMAL H
GFR SERPLBLD BASED ON 1.73 SQ M-ARVRAT: 79 ML/MIN
GLOBULIN SER-MCNC: 3.7 G/DL (ref 2–4)
GLUCOSE SERPL-MCNC: 101 MG/DL (ref 70–99)
HCV AB SER QL: NEGATIVE
HDLC SERPL-MCNC: 67 MG/DL
LDLC SERPL CALC-MCNC: 175 MG/DL
NONHDLC SERPL-MCNC: 202 MG/DL
POTASSIUM SERPL-SCNC: 4.9 MMOL/L (ref 3.4–5.1)
PROT SERPL-MCNC: 7.7 G/DL (ref 6.4–8.2)
SODIUM SERPL-SCNC: 140 MMOL/L (ref 135–145)
TRIGL SERPL-MCNC: 135 MG/DL
TSH SERPL-ACNC: 0.75 MCUNITS/ML (ref 0.35–5)

## 2021-12-22 ASSESSMENT — ENCOUNTER SYMPTOMS
GASTROINTESTINAL NEGATIVE: 1
NEUROLOGICAL NEGATIVE: 1
PSYCHIATRIC NEGATIVE: 1
RESPIRATORY NEGATIVE: 1

## 2021-12-23 RX ORDER — ROSUVASTATIN CALCIUM 5 MG/1
5 TABLET, COATED ORAL NIGHTLY
Qty: 90 TABLET | Refills: 0 | Status: SHIPPED | OUTPATIENT
Start: 2021-12-23 | End: 2022-05-10

## 2022-01-04 ENCOUNTER — OFFICE VISIT (OUTPATIENT)
Dept: OBGYN CLINIC | Facility: CLINIC | Age: 62
End: 2022-01-04
Payer: COMMERCIAL

## 2022-01-04 VITALS — SYSTOLIC BLOOD PRESSURE: 112 MMHG | BODY MASS INDEX: 25 KG/M2 | DIASTOLIC BLOOD PRESSURE: 72 MMHG | WEIGHT: 155 LBS

## 2022-01-04 DIAGNOSIS — N95.1 MENOPAUSAL SYMPTOMS: ICD-10-CM

## 2022-01-04 DIAGNOSIS — Z01.419 ENCOUNTER FOR WELL WOMAN EXAM WITH ROUTINE GYNECOLOGICAL EXAM: Primary | ICD-10-CM

## 2022-01-04 LAB
CYTOLOGY CVX/VAG DOC THIN PREP: NORMAL
HPV16+18+45 E6+E7MRNA CVX NAA+PROBE: NEGATIVE

## 2022-01-04 PROCEDURE — 99396 PREV VISIT EST AGE 40-64: CPT | Performed by: OBSTETRICS & GYNECOLOGY

## 2022-01-04 PROCEDURE — 3078F DIAST BP <80 MM HG: CPT | Performed by: OBSTETRICS & GYNECOLOGY

## 2022-01-04 PROCEDURE — 3074F SYST BP LT 130 MM HG: CPT | Performed by: OBSTETRICS & GYNECOLOGY

## 2022-01-04 PROCEDURE — 58301 REMOVE INTRAUTERINE DEVICE: CPT | Performed by: OBSTETRICS & GYNECOLOGY

## 2022-01-04 NOTE — PROGRESS NOTES
HPI:   Noreen Sosa is a 63year old female who presents for a    1) postmenopausal sx:  Pt has some hot flushes and some vaginal dryness/dyspareunia. Pt has been considering her options and wants to try HRT for a few weeks.   Counseled patient on t Problem Relation Age of Onset   • Cancer Father    • Hypertension Father    • Cancer Mother    • Hypertension Mother    • Breast Cancer Mother         72   • Breast Cancer Maternal Cousin Female         pre      Social History:   Social History    Tobacc TECHNIQUE: Pelvic ultrasound using transabdominal and transvaginal technique.  A transvaginal scan was performed for more detailed evaluation of the uterus, endometrium and adnexa       FINDINGS:       UTERUS:       ENDOMETRIUM: Heterogeneous endometrium Juan A Chowdary is a 63year old female who presents for a    1) postmenopausal sx:  Pt has some hot flushes and some vaginal dryness/dyspareunia. Pt has been considering her options and wants to try HRT for a few weeks.   Counseled patient on the ri

## 2022-01-05 LAB — HPV I/H RISK 1 DNA SPEC QL NAA+PROBE: NEGATIVE

## 2022-01-10 PROBLEM — N95.1 MENOPAUSAL SYMPTOMS: Status: ACTIVE | Noted: 2022-01-10

## 2022-01-10 RX ORDER — ESTRADIOL 0.5 MG/1
0.5 TABLET ORAL DAILY
Qty: 30 TABLET | Refills: 2 | Status: SHIPPED | OUTPATIENT
Start: 2022-01-10

## 2022-01-10 RX ORDER — PROGESTERONE 200 MG/1
200 CAPSULE ORAL AS DIRECTED
Qty: 12 CAPSULE | Refills: 3 | Status: SHIPPED | OUTPATIENT
Start: 2022-01-10

## 2022-03-22 ENCOUNTER — TELEPHONE (OUTPATIENT)
Dept: SCHEDULING | Age: 62
End: 2022-03-22

## 2022-03-22 DIAGNOSIS — E78.00 HYPERCHOLESTEROLEMIA: Primary | ICD-10-CM

## 2022-03-23 ENCOUNTER — LAB SERVICES (OUTPATIENT)
Dept: INTERNAL MEDICINE | Age: 62
End: 2022-03-23

## 2022-03-23 DIAGNOSIS — E78.5 DYSLIPIDEMIA: ICD-10-CM

## 2022-03-23 DIAGNOSIS — E78.00 HYPERCHOLESTEROLEMIA: ICD-10-CM

## 2022-03-23 PROCEDURE — 80076 HEPATIC FUNCTION PANEL: CPT | Performed by: INTERNAL MEDICINE

## 2022-03-23 PROCEDURE — 80061 LIPID PANEL: CPT | Performed by: INTERNAL MEDICINE

## 2022-03-23 PROCEDURE — 83036 HEMOGLOBIN GLYCOSYLATED A1C: CPT | Performed by: INTERNAL MEDICINE

## 2022-03-23 PROCEDURE — 36415 COLL VENOUS BLD VENIPUNCTURE: CPT | Performed by: INTERNAL MEDICINE

## 2022-03-24 LAB
ALBUMIN SERPL-MCNC: 4 G/DL (ref 3.6–5.1)
ALP SERPL-CCNC: 109 UNITS/L (ref 45–117)
ALT SERPL-CCNC: 27 UNITS/L
AST SERPL-CCNC: 24 UNITS/L
BILIRUB CONJ SERPL-MCNC: 0.1 MG/DL (ref 0–0.2)
BILIRUB SERPL-MCNC: 0.5 MG/DL (ref 0.2–1)
CHOLEST SERPL-MCNC: 253 MG/DL
CHOLEST/HDLC SERPL: 3.6 {RATIO}
FASTING DURATION TIME PATIENT: ABNORMAL H
HBA1C MFR BLD: 6.1 % (ref 4.5–5.6)
HDLC SERPL-MCNC: 71 MG/DL
LDLC SERPL CALC-MCNC: 164 MG/DL
NONHDLC SERPL-MCNC: 182 MG/DL
PROT SERPL-MCNC: 7.3 G/DL (ref 6.4–8.2)
TRIGL SERPL-MCNC: 91 MG/DL

## 2022-04-28 RX ORDER — ESTRADIOL 0.5 MG/1
TABLET ORAL
Qty: 30 TABLET | Refills: 2 | Status: SHIPPED | OUTPATIENT
Start: 2022-04-28

## 2022-04-29 NOTE — TELEPHONE ENCOUNTER
Please call pt and make sure she is taking estradiol and the progesterone as directed.    Schedule f/u 1 month

## 2022-05-05 ENCOUNTER — TELEPHONE (OUTPATIENT)
Dept: SCHEDULING | Age: 62
End: 2022-05-05

## 2022-05-09 ENCOUNTER — OFFICE VISIT (OUTPATIENT)
Dept: INTERNAL MEDICINE | Age: 62
End: 2022-05-09

## 2022-05-09 VITALS
OXYGEN SATURATION: 99 % | TEMPERATURE: 96.7 F | WEIGHT: 156.53 LBS | HEIGHT: 66 IN | DIASTOLIC BLOOD PRESSURE: 86 MMHG | SYSTOLIC BLOOD PRESSURE: 124 MMHG | BODY MASS INDEX: 25.16 KG/M2 | HEART RATE: 63 BPM

## 2022-05-09 DIAGNOSIS — R42 DIZZINESS, NONSPECIFIC: Primary | ICD-10-CM

## 2022-05-09 PROCEDURE — 99214 OFFICE O/P EST MOD 30 MIN: CPT | Performed by: INTERNAL MEDICINE

## 2022-05-09 RX ORDER — ESTRADIOL 0.5 MG/1
TABLET ORAL
COMMUNITY
Start: 2022-01-10

## 2022-05-09 RX ORDER — PROGESTERONE 200 MG/1
CAPSULE ORAL
COMMUNITY
Start: 2022-04-25

## 2022-05-09 ASSESSMENT — PATIENT HEALTH QUESTIONNAIRE - PHQ9
SUM OF ALL RESPONSES TO PHQ9 QUESTIONS 1 AND 2: 0
SUM OF ALL RESPONSES TO PHQ9 QUESTIONS 1 AND 2: 0
1. LITTLE INTEREST OR PLEASURE IN DOING THINGS: NOT AT ALL
2. FEELING DOWN, DEPRESSED OR HOPELESS: NOT AT ALL
CLINICAL INTERPRETATION OF PHQ2 SCORE: NO FURTHER SCREENING NEEDED

## 2022-05-09 ASSESSMENT — PAIN SCALES - GENERAL: PAINLEVEL: 0

## 2022-05-25 RX ORDER — PROGESTERONE 200 MG/1
CAPSULE ORAL
Qty: 12 CAPSULE | Refills: 3 | Status: SHIPPED | OUTPATIENT
Start: 2022-05-25

## 2022-08-01 RX ORDER — ESTRADIOL 0.5 MG/1
TABLET ORAL
Qty: 30 TABLET | Refills: 2 | OUTPATIENT
Start: 2022-08-01

## 2022-08-02 ENCOUNTER — TELEPHONE (OUTPATIENT)
Dept: OBGYN CLINIC | Facility: CLINIC | Age: 62
End: 2022-08-02

## 2022-08-02 RX ORDER — ESTRADIOL 0.5 MG/1
0.5 TABLET ORAL DAILY
Qty: 30 TABLET | Refills: 0 | Status: SHIPPED | OUTPATIENT
Start: 2022-08-02

## 2022-08-02 NOTE — TELEPHONE ENCOUNTER
Pt scheduled annual 8/15 and requesting refill, states she finished last pill today.  Please advise states she has bad migraines when shes not on medication

## 2022-08-02 NOTE — TELEPHONE ENCOUNTER
Patient of ASJ requesting refill on Estradiol 0.5 mg tab. ASJ not in office. Ok to refill? Routing to on call provider.  Please advise

## 2022-08-08 ENCOUNTER — TELEPHONE (OUTPATIENT)
Dept: SCHEDULING | Age: 62
End: 2022-08-08

## 2022-08-15 ENCOUNTER — OFFICE VISIT (OUTPATIENT)
Dept: OBGYN CLINIC | Facility: CLINIC | Age: 62
End: 2022-08-15
Payer: COMMERCIAL

## 2022-08-15 ENCOUNTER — TELEPHONE (OUTPATIENT)
Dept: OBGYN CLINIC | Facility: CLINIC | Age: 62
End: 2022-08-15

## 2022-08-15 VITALS — SYSTOLIC BLOOD PRESSURE: 110 MMHG | DIASTOLIC BLOOD PRESSURE: 70 MMHG | WEIGHT: 153 LBS | BODY MASS INDEX: 25 KG/M2

## 2022-08-15 DIAGNOSIS — Z78.0 MENOPAUSE: Primary | ICD-10-CM

## 2022-08-15 DIAGNOSIS — Z87.42 HISTORY OF DYSPAREUNIA IN FEMALE: ICD-10-CM

## 2022-08-15 DIAGNOSIS — N95.1 MENOPAUSAL HOT FLUSHES: ICD-10-CM

## 2022-08-15 PROCEDURE — 3074F SYST BP LT 130 MM HG: CPT | Performed by: OBSTETRICS & GYNECOLOGY

## 2022-08-15 PROCEDURE — 3078F DIAST BP <80 MM HG: CPT | Performed by: OBSTETRICS & GYNECOLOGY

## 2022-08-15 PROCEDURE — 99214 OFFICE O/P EST MOD 30 MIN: CPT | Performed by: OBSTETRICS & GYNECOLOGY

## 2022-08-15 RX ORDER — ESTRADIOL 0.5 MG/1
0.5 TABLET ORAL DAILY
Qty: 90 TABLET | Refills: 5 | Status: SHIPPED | OUTPATIENT
Start: 2022-08-15

## 2022-08-15 RX ORDER — PROGESTERONE 200 MG/1
200 CAPSULE ORAL DAILY
Qty: 36 CAPSULE | Refills: 5 | Status: SHIPPED | OUTPATIENT
Start: 2022-08-15

## 2022-10-05 RX ORDER — ESTRADIOL 0.5 MG/1
TABLET ORAL
Qty: 90 TABLET | Refills: 5 | OUTPATIENT
Start: 2022-10-05

## 2022-11-03 ENCOUNTER — TELEPHONE (OUTPATIENT)
Dept: INTERNAL MEDICINE | Age: 62
End: 2022-11-03

## 2022-11-03 ENCOUNTER — NURSE TRIAGE (OUTPATIENT)
Dept: TELEHEALTH | Age: 62
End: 2022-11-03

## 2022-11-04 ENCOUNTER — OFFICE VISIT (OUTPATIENT)
Dept: FAMILY MEDICINE | Age: 62
End: 2022-11-04

## 2022-11-04 ENCOUNTER — HOSPITAL ENCOUNTER (OUTPATIENT)
Dept: GENERAL RADIOLOGY | Age: 62
Discharge: HOME | End: 2022-11-04
Attending: PHYSICIAN ASSISTANT

## 2022-11-04 VITALS
RESPIRATION RATE: 16 BRPM | SYSTOLIC BLOOD PRESSURE: 116 MMHG | OXYGEN SATURATION: 98 % | WEIGHT: 157 LBS | TEMPERATURE: 97.1 F | DIASTOLIC BLOOD PRESSURE: 72 MMHG | BODY MASS INDEX: 25.23 KG/M2 | HEART RATE: 74 BPM | HEIGHT: 66 IN

## 2022-11-04 DIAGNOSIS — M25.551 RIGHT HIP PAIN: Primary | ICD-10-CM

## 2022-11-04 DIAGNOSIS — M25.551 RIGHT HIP PAIN: ICD-10-CM

## 2022-11-04 PROBLEM — N64.4 BREAST PAIN, LEFT: Status: RESOLVED | Noted: 2021-02-17 | Resolved: 2022-11-04

## 2022-11-04 PROBLEM — Z30.431 IUD CHECK UP: Status: RESOLVED | Noted: 2019-05-07 | Resolved: 2022-11-04

## 2022-11-04 PROCEDURE — 73502 X-RAY EXAM HIP UNI 2-3 VIEWS: CPT

## 2022-11-04 PROCEDURE — 99213 OFFICE O/P EST LOW 20 MIN: CPT | Performed by: PHYSICIAN ASSISTANT

## 2022-11-04 ASSESSMENT — PATIENT HEALTH QUESTIONNAIRE - PHQ9
SUM OF ALL RESPONSES TO PHQ9 QUESTIONS 1 AND 2: 0
1. LITTLE INTEREST OR PLEASURE IN DOING THINGS: NOT AT ALL
CLINICAL INTERPRETATION OF PHQ2 SCORE: NO FURTHER SCREENING NEEDED
SUM OF ALL RESPONSES TO PHQ9 QUESTIONS 1 AND 2: 0
2. FEELING DOWN, DEPRESSED OR HOPELESS: NOT AT ALL

## 2022-11-04 ASSESSMENT — PAIN SCALES - GENERAL: PAINLEVEL: 6

## 2022-11-08 ENCOUNTER — OFFICE VISIT (OUTPATIENT)
Dept: ORTHOPEDICS | Age: 62
End: 2022-11-08
Attending: PHYSICIAN ASSISTANT

## 2022-11-08 VITALS — WEIGHT: 157 LBS | BODY MASS INDEX: 25.23 KG/M2 | HEIGHT: 66 IN

## 2022-11-08 DIAGNOSIS — M16.11 PRIMARY OSTEOARTHRITIS OF RIGHT HIP: Primary | ICD-10-CM

## 2022-11-08 PROCEDURE — 99244 OFF/OP CNSLTJ NEW/EST MOD 40: CPT | Performed by: ORTHOPAEDIC SURGERY

## 2023-01-05 DIAGNOSIS — Z13.6 SCREENING, ISCHEMIC HEART DISEASE: Primary | ICD-10-CM

## 2023-01-09 DIAGNOSIS — Z13.6 SCREENING, ISCHEMIC HEART DISEASE: Primary | ICD-10-CM

## 2023-01-10 ENCOUNTER — HOSPITAL ENCOUNTER (OUTPATIENT)
Dept: CT IMAGING | Age: 63
Discharge: HOME | End: 2023-01-10

## 2023-01-10 DIAGNOSIS — Z13.6 SCREENING, ISCHEMIC HEART DISEASE: ICD-10-CM

## 2023-01-10 PROCEDURE — 75571 CT HRT W/O DYE W/CA TEST: CPT | Performed by: INTERNAL MEDICINE

## 2023-01-10 PROCEDURE — 75571 CT HRT W/O DYE W/CA TEST: CPT

## 2023-01-17 ENCOUNTER — ORDER TRANSCRIPTION (OUTPATIENT)
Dept: ADMINISTRATIVE | Facility: HOSPITAL | Age: 63
End: 2023-01-17

## 2023-01-17 DIAGNOSIS — Z12.31 ENCOUNTER FOR SCREENING MAMMOGRAM FOR MALIGNANT NEOPLASM OF BREAST: Primary | ICD-10-CM

## 2023-01-23 ENCOUNTER — TELEPHONE (OUTPATIENT)
Dept: CARDIOLOGY | Age: 63
End: 2023-01-23

## 2023-01-23 ENCOUNTER — APPOINTMENT (OUTPATIENT)
Dept: MAMMOGRAPHY | Age: 63
End: 2023-01-23

## 2023-01-23 ENCOUNTER — TELEPHONE (OUTPATIENT)
Dept: NEUROLOGY | Age: 63
End: 2023-01-23

## 2023-01-23 ENCOUNTER — OFFICE VISIT (OUTPATIENT)
Dept: INTERNAL MEDICINE | Age: 63
End: 2023-01-23

## 2023-01-23 VITALS
DIASTOLIC BLOOD PRESSURE: 79 MMHG | SYSTOLIC BLOOD PRESSURE: 125 MMHG | TEMPERATURE: 98.4 F | RESPIRATION RATE: 16 BRPM | HEIGHT: 66 IN | WEIGHT: 158.07 LBS | HEART RATE: 64 BPM | OXYGEN SATURATION: 97 % | BODY MASS INDEX: 25.4 KG/M2

## 2023-01-23 DIAGNOSIS — E78.00 HYPERCHOLESTEROLEMIA: ICD-10-CM

## 2023-01-23 DIAGNOSIS — R73.03 PREDIABETES: ICD-10-CM

## 2023-01-23 DIAGNOSIS — R42 DIZZINESS, NONSPECIFIC: Primary | ICD-10-CM

## 2023-01-23 LAB
25(OH)D3+25(OH)D2 SERPL-MCNC: 19.9 NG/ML (ref 30–100)
ALBUMIN SERPL-MCNC: 3.8 G/DL (ref 3.6–5.1)
ALBUMIN/GLOB SERPL: 1.1 {RATIO} (ref 1–2.4)
ALP SERPL-CCNC: 117 UNITS/L (ref 45–117)
ALT SERPL-CCNC: 20 UNITS/L
ANION GAP SERPL CALC-SCNC: 9 MMOL/L (ref 7–19)
AST SERPL-CCNC: 18 UNITS/L
BASOPHILS # BLD: 0.1 K/MCL (ref 0–0.3)
BASOPHILS NFR BLD: 1 %
BILIRUB SERPL-MCNC: 0.4 MG/DL (ref 0.2–1)
BUN SERPL-MCNC: 11 MG/DL (ref 6–20)
BUN/CREAT SERPL: 15 (ref 7–25)
CALCIUM SERPL-MCNC: 9.7 MG/DL (ref 8.4–10.2)
CHLORIDE SERPL-SCNC: 105 MMOL/L (ref 97–110)
CHOLEST SERPL-MCNC: 268 MG/DL
CHOLEST/HDLC SERPL: 4.4 {RATIO}
CO2 SERPL-SCNC: 29 MMOL/L (ref 21–32)
CREAT SERPL-MCNC: 0.73 MG/DL (ref 0.51–0.95)
DEPRECATED RDW RBC: 41.4 FL (ref 39–50)
EOSINOPHIL # BLD: 0.1 K/MCL (ref 0–0.5)
EOSINOPHIL NFR BLD: 1 %
ERYTHROCYTE [DISTWIDTH] IN BLOOD: 12 % (ref 11–15)
FASTING DURATION TIME PATIENT: ABNORMAL H
FASTING DURATION TIME PATIENT: ABNORMAL H
FOLATE SERPL-MCNC: 12.9 NG/ML
GFR SERPLBLD BASED ON 1.73 SQ M-ARVRAT: >90 ML/MIN
GLOBULIN SER-MCNC: 3.4 G/DL (ref 2–4)
GLUCOSE SERPL-MCNC: 103 MG/DL (ref 70–99)
HBA1C MFR BLD: 6 % (ref 4.5–5.6)
HCT VFR BLD CALC: 43 % (ref 36–46.5)
HDLC SERPL-MCNC: 61 MG/DL
HGB BLD-MCNC: 13.7 G/DL (ref 12–15.5)
IMM GRANULOCYTES # BLD AUTO: 0 K/MCL (ref 0–0.2)
IMM GRANULOCYTES # BLD: 0 %
IRON SATN MFR SERPL: 23 % (ref 15–45)
IRON SERPL-MCNC: 67 MCG/DL (ref 50–170)
LDLC SERPL CALC-MCNC: 178 MG/DL
LYMPHOCYTES # BLD: 1.6 K/MCL (ref 1–4)
LYMPHOCYTES NFR BLD: 25 %
MCH RBC QN AUTO: 30.2 PG (ref 26–34)
MCHC RBC AUTO-ENTMCNC: 31.9 G/DL (ref 32–36.5)
MCV RBC AUTO: 94.7 FL (ref 78–100)
MONOCYTES # BLD: 0.5 K/MCL (ref 0.3–0.9)
MONOCYTES NFR BLD: 8 %
NEUTROPHILS # BLD: 4.2 K/MCL (ref 1.8–7.7)
NEUTROPHILS NFR BLD: 65 %
NONHDLC SERPL-MCNC: 207 MG/DL
NRBC BLD MANUAL-RTO: 0 /100 WBC
PLATELET # BLD AUTO: 363 K/MCL (ref 140–450)
POTASSIUM SERPL-SCNC: 5.1 MMOL/L (ref 3.4–5.1)
PROT SERPL-MCNC: 7.2 G/DL (ref 6.4–8.2)
RBC # BLD: 4.54 MIL/MCL (ref 4–5.2)
SODIUM SERPL-SCNC: 138 MMOL/L (ref 135–145)
TIBC SERPL-MCNC: 297 MCG/DL (ref 250–450)
TRIGL SERPL-MCNC: 146 MG/DL
TSH SERPL-ACNC: 0.92 MCUNITS/ML (ref 0.35–5)
VIT B12 SERPL-MCNC: 690 PG/ML (ref 211–911)
WBC # BLD: 6.5 K/MCL (ref 4.2–11)

## 2023-01-23 PROCEDURE — 93000 ELECTROCARDIOGRAM COMPLETE: CPT | Performed by: INTERNAL MEDICINE

## 2023-01-23 PROCEDURE — 83540 ASSAY OF IRON: CPT | Performed by: INTERNAL MEDICINE

## 2023-01-23 PROCEDURE — 83036 HEMOGLOBIN GLYCOSYLATED A1C: CPT | Performed by: INTERNAL MEDICINE

## 2023-01-23 PROCEDURE — 83550 IRON BINDING TEST: CPT | Performed by: INTERNAL MEDICINE

## 2023-01-23 PROCEDURE — 82607 VITAMIN B-12: CPT | Performed by: INTERNAL MEDICINE

## 2023-01-23 PROCEDURE — 82746 ASSAY OF FOLIC ACID SERUM: CPT | Performed by: INTERNAL MEDICINE

## 2023-01-23 PROCEDURE — 80061 LIPID PANEL: CPT | Performed by: INTERNAL MEDICINE

## 2023-01-23 PROCEDURE — 99214 OFFICE O/P EST MOD 30 MIN: CPT | Performed by: INTERNAL MEDICINE

## 2023-01-23 PROCEDURE — 82306 VITAMIN D 25 HYDROXY: CPT | Performed by: INTERNAL MEDICINE

## 2023-01-23 PROCEDURE — 80050 GENERAL HEALTH PANEL: CPT | Performed by: INTERNAL MEDICINE

## 2023-01-23 PROCEDURE — 36415 COLL VENOUS BLD VENIPUNCTURE: CPT | Performed by: INTERNAL MEDICINE

## 2023-01-23 RX ORDER — DOXYCYCLINE HYCLATE 100 MG/1
100 CAPSULE ORAL DAILY
COMMUNITY
Start: 2023-01-18 | End: 2023-02-02

## 2023-01-23 ASSESSMENT — PATIENT HEALTH QUESTIONNAIRE - PHQ9
2. FEELING DOWN, DEPRESSED OR HOPELESS: NOT AT ALL
SUM OF ALL RESPONSES TO PHQ9 QUESTIONS 1 AND 2: 0
SUM OF ALL RESPONSES TO PHQ9 QUESTIONS 1 AND 2: 0
CLINICAL INTERPRETATION OF PHQ2 SCORE: NO FURTHER SCREENING NEEDED
1. LITTLE INTEREST OR PLEASURE IN DOING THINGS: NOT AT ALL

## 2023-01-24 ENCOUNTER — E-ADVICE (OUTPATIENT)
Dept: INTERNAL MEDICINE | Age: 63
End: 2023-01-24

## 2023-01-30 ENCOUNTER — TELEPHONE (OUTPATIENT)
Dept: CARDIOLOGY | Age: 63
End: 2023-01-30

## 2023-02-01 ENCOUNTER — E-ADVICE (OUTPATIENT)
Dept: FAMILY MEDICINE | Age: 63
End: 2023-02-01

## 2023-02-02 ENCOUNTER — OFFICE VISIT (OUTPATIENT)
Dept: CARDIOLOGY | Age: 63
End: 2023-02-02
Attending: INTERNAL MEDICINE

## 2023-02-02 VITALS
HEIGHT: 65 IN | WEIGHT: 157 LBS | SYSTOLIC BLOOD PRESSURE: 90 MMHG | TEMPERATURE: 96.4 F | HEART RATE: 87 BPM | OXYGEN SATURATION: 98 % | BODY MASS INDEX: 26.16 KG/M2 | DIASTOLIC BLOOD PRESSURE: 60 MMHG

## 2023-02-02 DIAGNOSIS — R53.83 OTHER FATIGUE: Primary | ICD-10-CM

## 2023-02-02 DIAGNOSIS — R42 DIZZINESS, NONSPECIFIC: ICD-10-CM

## 2023-02-02 DIAGNOSIS — R94.31 ABNORMAL ELECTROCARDIOGRAM (ECG) (EKG): ICD-10-CM

## 2023-02-02 PROCEDURE — 99204 OFFICE O/P NEW MOD 45 MIN: CPT | Performed by: INTERNAL MEDICINE

## 2023-02-02 ASSESSMENT — PATIENT HEALTH QUESTIONNAIRE - PHQ9
CLINICAL INTERPRETATION OF PHQ2 SCORE: NO FURTHER SCREENING NEEDED
SUM OF ALL RESPONSES TO PHQ9 QUESTIONS 1 AND 2: 0
SUM OF ALL RESPONSES TO PHQ9 QUESTIONS 1 AND 2: 0
1. LITTLE INTEREST OR PLEASURE IN DOING THINGS: NOT AT ALL
2. FEELING DOWN, DEPRESSED OR HOPELESS: NOT AT ALL

## 2023-02-07 ENCOUNTER — HOSPITAL ENCOUNTER (OUTPATIENT)
Dept: MAMMOGRAPHY | Facility: HOSPITAL | Age: 63
Discharge: HOME | End: 2023-02-07
Attending: FAMILY MEDICINE
Payer: COMMERCIAL

## 2023-02-07 DIAGNOSIS — Z12.31 ENCOUNTER FOR SCREENING MAMMOGRAM FOR MALIGNANT NEOPLASM OF BREAST: ICD-10-CM

## 2023-02-07 LAB — HM MAMMOGRAPHY BILATERAL: NORMAL

## 2023-02-07 PROCEDURE — 77063 BREAST TOMOSYNTHESIS BI: CPT | Performed by: INTERNAL MEDICINE

## 2023-02-07 PROCEDURE — 77067 SCR MAMMO BI INCL CAD: CPT | Performed by: INTERNAL MEDICINE

## 2023-02-09 ENCOUNTER — APPOINTMENT (OUTPATIENT)
Dept: CARDIOLOGY | Age: 63
End: 2023-02-09

## 2023-02-14 ENCOUNTER — APPOINTMENT (OUTPATIENT)
Dept: CARDIOLOGY | Age: 63
End: 2023-02-14
Attending: INTERNAL MEDICINE

## 2023-02-16 ENCOUNTER — APPOINTMENT (OUTPATIENT)
Dept: CARDIOLOGY | Age: 63
End: 2023-02-16
Attending: INTERNAL MEDICINE

## 2023-02-20 ENCOUNTER — APPOINTMENT (OUTPATIENT)
Dept: CARDIOLOGY | Age: 63
End: 2023-02-20
Attending: INTERNAL MEDICINE

## 2023-02-22 ENCOUNTER — ANCILLARY PROCEDURE (OUTPATIENT)
Dept: CARDIOLOGY | Age: 63
End: 2023-02-22
Attending: INTERNAL MEDICINE

## 2023-02-22 DIAGNOSIS — R42 DIZZINESS, NONSPECIFIC: ICD-10-CM

## 2023-02-22 LAB
AORTIC VALVE AREA (AVA): 0.8
AORTIC VALVE AREA: 2.78
ASCENDING AORTA (AAD): 3
AV MEAN GRADIENT (AVMG): 5
AV MEAN VELOCITY (AVMV): 1.08
AV PEAK GRADIENT (AVPG): 10
AV PEAK VELOCITY (AVPV): 1.56
AV STENOSIS SEVERITY TEXT: NORMAL
AVI LVOT PEAK GRADIENT (LVOTMG): 1
E WAVE DECELARATION TIME (MDT): 12.97
INTERVENTRICULAR SEPTUM IN END DIASTOLE (IVSD): 2.76
LEFT INTERNAL DIMENSION IN SYSTOLE (LVSD): 0.8
LEFT VENTRICULAR INTERNAL DIMENSION IN DIASTOLE (LVDD): 2.7
LEFT VENTRICULAR POSTERIOR WALL IN END DIASTOLE (LVPW): 4.4
LV EF: NORMAL %
LVOT 2D (LVOTD): 25.3
LVOT VTI (LVOTVTI): 1.27
MV E TISSUE VEL MED (MESV): 10.8
MV E WAVE VEL/E TISSUE VEL MED(MSR): 6.2
MV PEAK A VELOCITY (MVPAV): 253
MV PEAK E VELOCITY (MVPEV): 0.92
RV END SYSTOLIC LONGITUDINAL STRAIN FREE WALL (RVGS): 2.1
TRICUSPID VALVE PEAK REGURGITATION VELOCITY (TRPV): 2.78
TV ESTIMATED RIGHT ARTERIAL PRESSURE (RAP): 14.7

## 2023-02-22 PROCEDURE — 93306 TTE W/DOPPLER COMPLETE: CPT | Performed by: INTERNAL MEDICINE

## 2023-02-28 ENCOUNTER — APPOINTMENT (OUTPATIENT)
Dept: FAMILY MEDICINE | Age: 63
End: 2023-02-28

## 2023-02-28 ENCOUNTER — TELEPHONE (OUTPATIENT)
Dept: FAMILY MEDICINE | Age: 63
End: 2023-02-28

## 2023-04-14 ENCOUNTER — CLINICAL ABSTRACT (OUTPATIENT)
Dept: FAMILY MEDICINE | Age: 63
End: 2023-04-14

## 2023-05-25 ENCOUNTER — OFFICE VISIT (OUTPATIENT)
Dept: CARDIOLOGY | Age: 63
End: 2023-05-25

## 2023-05-25 VITALS
OXYGEN SATURATION: 98 % | SYSTOLIC BLOOD PRESSURE: 118 MMHG | HEIGHT: 65 IN | TEMPERATURE: 96.7 F | BODY MASS INDEX: 26.06 KG/M2 | DIASTOLIC BLOOD PRESSURE: 82 MMHG | HEART RATE: 68 BPM | WEIGHT: 156.42 LBS

## 2023-05-25 DIAGNOSIS — R42 DIZZINESS, NONSPECIFIC: ICD-10-CM

## 2023-05-25 DIAGNOSIS — R07.9 CHEST PAIN, UNSPECIFIED TYPE: Primary | ICD-10-CM

## 2023-05-25 PROCEDURE — 99214 OFFICE O/P EST MOD 30 MIN: CPT | Performed by: INTERNAL MEDICINE

## 2023-06-14 ENCOUNTER — TELEPHONE (OUTPATIENT)
Dept: CT IMAGING | Age: 63
End: 2023-06-14

## 2023-06-15 ENCOUNTER — APPOINTMENT (OUTPATIENT)
Dept: CT IMAGING | Age: 63
End: 2023-06-15
Attending: INTERNAL MEDICINE

## 2023-06-19 ENCOUNTER — TELEPHONE (OUTPATIENT)
Dept: CT IMAGING | Age: 63
End: 2023-06-19

## 2023-06-20 ENCOUNTER — HOSPITAL ENCOUNTER (OUTPATIENT)
Dept: CT IMAGING | Age: 63
Discharge: HOME | End: 2023-06-20
Attending: INTERNAL MEDICINE

## 2023-06-20 VITALS — SYSTOLIC BLOOD PRESSURE: 136 MMHG | DIASTOLIC BLOOD PRESSURE: 73 MMHG | HEART RATE: 80 BPM

## 2023-06-20 DIAGNOSIS — R07.9 CHEST PAIN, UNSPECIFIED TYPE: ICD-10-CM

## 2023-06-20 PROCEDURE — 75574 CT ANGIO HRT W/3D IMAGE: CPT | Performed by: INTERNAL MEDICINE

## 2023-06-20 PROCEDURE — 10002803 HB RX 637: Performed by: INTERNAL MEDICINE

## 2023-06-20 PROCEDURE — G1004 CDSM NDSC: HCPCS

## 2023-06-20 PROCEDURE — 10002803 HB RX 637

## 2023-06-20 PROCEDURE — 10002805 HB CONTRAST AGENT: Performed by: INTERNAL MEDICINE

## 2023-06-20 PROCEDURE — 75574 CT ANGIO HRT W/3D IMAGE: CPT

## 2023-06-20 PROCEDURE — G1004 CDSM NDSC: HCPCS | Performed by: INTERNAL MEDICINE

## 2023-06-20 RX ORDER — NITROGLYCERIN 0.4 MG/1
TABLET SUBLINGUAL PRN
Status: COMPLETED | OUTPATIENT
Start: 2023-06-20 | End: 2023-06-20

## 2023-06-20 RX ADMIN — IOHEXOL 80 ML: 350 INJECTION, SOLUTION INTRAVENOUS at 13:50

## 2023-06-20 RX ADMIN — NITROGLYCERIN 0.4 MG: 0.4 TABLET SUBLINGUAL at 13:37

## 2023-06-26 ENCOUNTER — TELEPHONE (OUTPATIENT)
Dept: CARDIOLOGY | Age: 63
End: 2023-06-26

## 2023-08-15 ENCOUNTER — OFFICE VISIT (OUTPATIENT)
Dept: OBGYN CLINIC | Facility: CLINIC | Age: 63
End: 2023-08-15

## 2023-08-15 VITALS
DIASTOLIC BLOOD PRESSURE: 78 MMHG | HEIGHT: 65 IN | BODY MASS INDEX: 25.49 KG/M2 | WEIGHT: 153 LBS | SYSTOLIC BLOOD PRESSURE: 124 MMHG | HEART RATE: 89 BPM

## 2023-08-15 DIAGNOSIS — Z01.419 ENCOUNTER FOR GYNECOLOGICAL EXAMINATION WITHOUT ABNORMAL FINDING: Primary | ICD-10-CM

## 2023-08-15 PROCEDURE — 3078F DIAST BP <80 MM HG: CPT | Performed by: OBSTETRICS & GYNECOLOGY

## 2023-08-15 PROCEDURE — 3008F BODY MASS INDEX DOCD: CPT | Performed by: OBSTETRICS & GYNECOLOGY

## 2023-08-15 PROCEDURE — 3074F SYST BP LT 130 MM HG: CPT | Performed by: OBSTETRICS & GYNECOLOGY

## 2023-08-15 PROCEDURE — 99396 PREV VISIT EST AGE 40-64: CPT | Performed by: OBSTETRICS & GYNECOLOGY

## 2023-08-20 RX ORDER — ESTRADIOL 0.5 MG/1
0.5 TABLET ORAL DAILY
Qty: 90 TABLET | Refills: 5 | Status: SHIPPED | OUTPATIENT
Start: 2023-08-20

## 2023-08-20 RX ORDER — PROGESTERONE 200 MG/1
200 CAPSULE ORAL DAILY
Qty: 36 CAPSULE | Refills: 5 | Status: SHIPPED | OUTPATIENT
Start: 2023-08-20

## 2023-08-24 ENCOUNTER — APPOINTMENT (OUTPATIENT)
Dept: CARDIOLOGY | Age: 63
End: 2023-08-24

## 2023-09-05 ENCOUNTER — APPOINTMENT (OUTPATIENT)
Dept: CARDIOLOGY | Age: 63
End: 2023-09-05

## 2023-09-26 ENCOUNTER — E-ADVICE (OUTPATIENT)
Dept: INTERNAL MEDICINE | Age: 63
End: 2023-09-26

## 2023-09-27 NOTE — TELEPHONE ENCOUNTER
Spoke to pt and offered her 2/1, but that does not work for her. Feb 2 nd is the monthly meeting and the surgery scheduling have all sx cases start at 9am (w/ your office hours that will not be available to offer her). She inquired about feb 5th, but you are on call and have office hours. Will look at 2/7 and 2/9 as possible options & will call her for time options.    She Understood and verbalized agreement Female

## 2023-10-06 ENCOUNTER — OFFICE VISIT (OUTPATIENT)
Dept: HEMATOLOGY/ONCOLOGY | Facility: HOSPITAL | Age: 63
End: 2023-10-06
Attending: INTERNAL MEDICINE
Payer: COMMERCIAL

## 2023-10-06 VITALS
WEIGHT: 156.19 LBS | RESPIRATION RATE: 16 BRPM | DIASTOLIC BLOOD PRESSURE: 74 MMHG | HEART RATE: 59 BPM | HEIGHT: 66 IN | BODY MASS INDEX: 25.1 KG/M2 | TEMPERATURE: 98 F | SYSTOLIC BLOOD PRESSURE: 125 MMHG | OXYGEN SATURATION: 99 %

## 2023-10-06 DIAGNOSIS — Z80.3 FAMILY HISTORY OF BREAST CANCER IN FIRST DEGREE RELATIVE: Primary | ICD-10-CM

## 2023-10-16 ENCOUNTER — E-ADVICE (OUTPATIENT)
Dept: INTERNAL MEDICINE | Age: 63
End: 2023-10-16

## 2023-11-02 ENCOUNTER — OFFICE VISIT (OUTPATIENT)
Dept: INTERNAL MEDICINE | Age: 63
End: 2023-11-02

## 2023-11-02 VITALS
HEART RATE: 62 BPM | TEMPERATURE: 97.8 F | WEIGHT: 156.09 LBS | OXYGEN SATURATION: 99 % | HEIGHT: 65 IN | BODY MASS INDEX: 26.01 KG/M2 | RESPIRATION RATE: 16 BRPM | DIASTOLIC BLOOD PRESSURE: 73 MMHG | SYSTOLIC BLOOD PRESSURE: 146 MMHG

## 2023-11-02 DIAGNOSIS — Z78.0 POSTMENOPAUSE: ICD-10-CM

## 2023-11-02 DIAGNOSIS — M79.651 MUSCULOSKELETAL THIGH PAIN, RIGHT: ICD-10-CM

## 2023-11-02 DIAGNOSIS — Z23 FLU VACCINE NEED: ICD-10-CM

## 2023-11-02 DIAGNOSIS — Z00.00 ANNUAL PHYSICAL EXAM: Primary | ICD-10-CM

## 2023-11-02 DIAGNOSIS — R03.0 ELEVATED BLOOD PRESSURE READING WITHOUT DIAGNOSIS OF HYPERTENSION: ICD-10-CM

## 2023-11-02 DIAGNOSIS — E55.9 VITAMIN D DEFICIENCY: ICD-10-CM

## 2023-11-02 PROCEDURE — 82306 VITAMIN D 25 HYDROXY: CPT | Performed by: CLINICAL MEDICAL LABORATORY

## 2023-11-02 PROCEDURE — 80050 GENERAL HEALTH PANEL: CPT | Performed by: CLINICAL MEDICAL LABORATORY

## 2023-11-02 PROCEDURE — 99396 PREV VISIT EST AGE 40-64: CPT | Performed by: INTERNAL MEDICINE

## 2023-11-02 PROCEDURE — 90686 IIV4 VACC NO PRSV 0.5 ML IM: CPT | Performed by: INTERNAL MEDICINE

## 2023-11-02 PROCEDURE — 90471 IMMUNIZATION ADMIN: CPT | Performed by: INTERNAL MEDICINE

## 2023-11-02 PROCEDURE — 83036 HEMOGLOBIN GLYCOSYLATED A1C: CPT | Performed by: CLINICAL MEDICAL LABORATORY

## 2023-11-02 PROCEDURE — 80061 LIPID PANEL: CPT | Performed by: CLINICAL MEDICAL LABORATORY

## 2023-11-02 ASSESSMENT — PATIENT HEALTH QUESTIONNAIRE - PHQ9
2. FEELING DOWN, DEPRESSED OR HOPELESS: NOT AT ALL
1. LITTLE INTEREST OR PLEASURE IN DOING THINGS: NOT AT ALL
SUM OF ALL RESPONSES TO PHQ9 QUESTIONS 1 AND 2: 0
SUM OF ALL RESPONSES TO PHQ9 QUESTIONS 1 AND 2: 0
CLINICAL INTERPRETATION OF PHQ2 SCORE: NO FURTHER SCREENING NEEDED

## 2023-11-03 ENCOUNTER — APPOINTMENT (OUTPATIENT)
Dept: HEMATOLOGY/ONCOLOGY | Facility: HOSPITAL | Age: 63
End: 2023-11-03
Attending: INTERNAL MEDICINE
Payer: COMMERCIAL

## 2023-11-03 LAB
25(OH)D3+25(OH)D2 SERPL-MCNC: 39.8 NG/ML (ref 30–100)
ALBUMIN SERPL-MCNC: 3.7 G/DL (ref 3.6–5.1)
ALBUMIN/GLOB SERPL: 1 {RATIO} (ref 1–2.4)
ALP SERPL-CCNC: 114 UNITS/L (ref 45–117)
ALT SERPL-CCNC: 25 UNITS/L
ANION GAP SERPL CALC-SCNC: 9 MMOL/L (ref 7–19)
AST SERPL-CCNC: 23 UNITS/L
BASOPHILS # BLD: 0 K/MCL (ref 0–0.3)
BASOPHILS NFR BLD: 1 %
BILIRUB SERPL-MCNC: 0.5 MG/DL (ref 0.2–1)
BUN SERPL-MCNC: 14 MG/DL (ref 6–20)
BUN/CREAT SERPL: 16 (ref 7–25)
CALCIUM SERPL-MCNC: 8.9 MG/DL (ref 8.4–10.2)
CHLORIDE SERPL-SCNC: 106 MMOL/L (ref 97–110)
CHOLEST SERPL-MCNC: 288 MG/DL
CHOLEST/HDLC SERPL: 4.4 {RATIO}
CO2 SERPL-SCNC: 28 MMOL/L (ref 21–32)
CREAT SERPL-MCNC: 0.86 MG/DL (ref 0.51–0.95)
DEPRECATED RDW RBC: 41.5 FL (ref 39–50)
EGFRCR SERPLBLD CKD-EPI 2021: 77 ML/MIN/{1.73_M2}
EOSINOPHIL # BLD: 0.2 K/MCL (ref 0–0.5)
EOSINOPHIL NFR BLD: 2 %
ERYTHROCYTE [DISTWIDTH] IN BLOOD: 12 % (ref 11–15)
FASTING DURATION TIME PATIENT: ABNORMAL H
GLOBULIN SER-MCNC: 3.6 G/DL (ref 2–4)
GLUCOSE SERPL-MCNC: 104 MG/DL (ref 70–99)
HBA1C MFR BLD: 6 % (ref 4.5–5.6)
HCT VFR BLD CALC: 43.3 % (ref 36–46.5)
HDLC SERPL-MCNC: 66 MG/DL
HGB BLD-MCNC: 14.1 G/DL (ref 12–15.5)
IMM GRANULOCYTES # BLD AUTO: 0 K/MCL (ref 0–0.2)
IMM GRANULOCYTES # BLD: 0 %
LDLC SERPL CALC-MCNC: 191 MG/DL
LYMPHOCYTES # BLD: 1.7 K/MCL (ref 1–4)
LYMPHOCYTES NFR BLD: 26 %
MCH RBC QN AUTO: 30.6 PG (ref 26–34)
MCHC RBC AUTO-ENTMCNC: 32.6 G/DL (ref 32–36.5)
MCV RBC AUTO: 93.9 FL (ref 78–100)
MONOCYTES # BLD: 0.4 K/MCL (ref 0.3–0.9)
MONOCYTES NFR BLD: 6 %
NEUTROPHILS # BLD: 4.4 K/MCL (ref 1.8–7.7)
NEUTROPHILS NFR BLD: 65 %
NONHDLC SERPL-MCNC: 222 MG/DL
NRBC BLD MANUAL-RTO: 0 /100 WBC
PLATELET # BLD AUTO: 344 K/MCL (ref 140–450)
POTASSIUM SERPL-SCNC: 4.5 MMOL/L (ref 3.4–5.1)
PROT SERPL-MCNC: 7.3 G/DL (ref 6.4–8.2)
RBC # BLD: 4.61 MIL/MCL (ref 4–5.2)
SODIUM SERPL-SCNC: 138 MMOL/L (ref 135–145)
TRIGL SERPL-MCNC: 155 MG/DL
TSH SERPL-ACNC: 1.07 MCUNITS/ML (ref 0.35–5)
WBC # BLD: 6.7 K/MCL (ref 4.2–11)

## 2023-11-06 ENCOUNTER — E-ADVICE (OUTPATIENT)
Dept: INTERNAL MEDICINE | Age: 63
End: 2023-11-06

## 2023-11-13 ENCOUNTER — APPOINTMENT (OUTPATIENT)
Dept: MRI IMAGING | Age: 63
End: 2023-11-13
Attending: ORTHOPAEDIC SURGERY

## 2023-11-13 DIAGNOSIS — M84.30XA: Primary | ICD-10-CM

## 2023-11-15 ENCOUNTER — HOSPITAL ENCOUNTER (OUTPATIENT)
Dept: MRI IMAGING | Age: 63
Discharge: HOME | End: 2023-11-15
Attending: ORTHOPAEDIC SURGERY

## 2023-11-15 DIAGNOSIS — M84.30XA: ICD-10-CM

## 2023-11-15 PROCEDURE — 73721 MRI JNT OF LWR EXTRE W/O DYE: CPT

## 2023-11-17 ENCOUNTER — APPOINTMENT (OUTPATIENT)
Dept: GENERAL RADIOLOGY | Age: 63
End: 2023-11-17
Attending: EMERGENCY MEDICINE
Payer: COMMERCIAL

## 2023-11-17 ENCOUNTER — OFFICE VISIT (OUTPATIENT)
Dept: HEMATOLOGY/ONCOLOGY | Facility: HOSPITAL | Age: 63
End: 2023-11-17
Attending: INTERNAL MEDICINE
Payer: COMMERCIAL

## 2023-11-17 ENCOUNTER — HOSPITAL ENCOUNTER (OUTPATIENT)
Age: 63
Discharge: HOME | End: 2023-11-17
Attending: EMERGENCY MEDICINE
Payer: COMMERCIAL

## 2023-11-17 VITALS
DIASTOLIC BLOOD PRESSURE: 78 MMHG | HEART RATE: 65 BPM | TEMPERATURE: 98 F | SYSTOLIC BLOOD PRESSURE: 140 MMHG | RESPIRATION RATE: 18 BRPM | OXYGEN SATURATION: 100 %

## 2023-11-17 VITALS
WEIGHT: 155 LBS | DIASTOLIC BLOOD PRESSURE: 75 MMHG | HEIGHT: 66 IN | BODY MASS INDEX: 24.91 KG/M2 | OXYGEN SATURATION: 98 % | HEART RATE: 63 BPM | SYSTOLIC BLOOD PRESSURE: 134 MMHG | TEMPERATURE: 97 F | RESPIRATION RATE: 16 BRPM

## 2023-11-17 DIAGNOSIS — Z80.3 FAMILY HISTORY OF BREAST CANCER IN FIRST DEGREE RELATIVE: Primary | ICD-10-CM

## 2023-11-17 DIAGNOSIS — Z12.39 BREAST CANCER SCREENING, HIGH RISK PATIENT: ICD-10-CM

## 2023-11-17 DIAGNOSIS — S80.12XA CONTUSION OF LEFT LOWER LEG, INITIAL ENCOUNTER: ICD-10-CM

## 2023-11-17 DIAGNOSIS — Z12.31 SCREENING MAMMOGRAM FOR BREAST CANCER: ICD-10-CM

## 2023-11-17 DIAGNOSIS — Z84.89 FAMILY HISTORY OF GENETIC DISEASE: ICD-10-CM

## 2023-11-17 DIAGNOSIS — Z12.39 BREAST CANCER SCREENING OTHER THAN MAMMOGRAM: ICD-10-CM

## 2023-11-17 DIAGNOSIS — S93.402A SPRAIN OF LEFT ANKLE, UNSPECIFIED LIGAMENT, INITIAL ENCOUNTER: Primary | ICD-10-CM

## 2023-11-17 PROCEDURE — 99203 OFFICE O/P NEW LOW 30 MIN: CPT

## 2023-11-17 PROCEDURE — 99244 OFF/OP CNSLTJ NEW/EST MOD 40: CPT | Performed by: INTERNAL MEDICINE

## 2023-11-17 PROCEDURE — 99213 OFFICE O/P EST LOW 20 MIN: CPT

## 2023-11-17 PROCEDURE — 73610 X-RAY EXAM OF ANKLE: CPT | Performed by: EMERGENCY MEDICINE

## 2023-11-17 RX ORDER — LORAZEPAM 0.5 MG/1
TABLET ORAL
Qty: 2 TABLET | Refills: 0 | Status: SHIPPED | OUTPATIENT
Start: 2023-11-17

## 2023-11-17 RX ORDER — PROGESTERONE 200 MG/1
CAPSULE ORAL
COMMUNITY
Start: 2023-08-21

## 2023-11-17 RX ORDER — ROSUVASTATIN CALCIUM 5 MG/1
5 TABLET, COATED ORAL NIGHTLY
COMMUNITY

## 2023-11-17 RX ORDER — ESTRADIOL 0.5 MG/1
TABLET ORAL
COMMUNITY
Start: 2023-08-21

## 2023-11-20 ENCOUNTER — TELEPHONE (OUTPATIENT)
Dept: GENETICS | Facility: HOSPITAL | Age: 63
End: 2023-11-20

## 2023-11-20 NOTE — TELEPHONE ENCOUNTER
I spoke to patient regarding scheduling a genetic consult. She is interested, but busy. She will call back to schedule.

## 2023-11-28 ENCOUNTER — TELEPHONE (OUTPATIENT)
Dept: GENETICS | Facility: HOSPITAL | Age: 63
End: 2023-11-28

## 2023-11-28 ENCOUNTER — HOSPITAL ENCOUNTER (OUTPATIENT)
Dept: BONE DENSITY | Facility: HOSPITAL | Age: 63
Discharge: HOME | End: 2023-11-28
Attending: INTERNAL MEDICINE
Payer: COMMERCIAL

## 2023-11-28 DIAGNOSIS — Z78.0 POSTMENOPAUSE: ICD-10-CM

## 2023-11-28 PROCEDURE — 77080 DXA BONE DENSITY AXIAL: CPT | Performed by: INTERNAL MEDICINE

## 2023-12-01 ENCOUNTER — TELEPHONE (OUTPATIENT)
Dept: INTERNAL MEDICINE | Age: 63
End: 2023-12-01

## 2023-12-12 ENCOUNTER — TELEPHONE (OUTPATIENT)
Dept: GENETICS | Facility: HOSPITAL | Age: 63
End: 2023-12-12

## 2024-01-02 ENCOUNTER — APPOINTMENT (OUTPATIENT)
Dept: INTERNAL MEDICINE | Age: 64
End: 2024-01-02

## 2024-01-02 VITALS
HEIGHT: 65 IN | HEART RATE: 64 BPM | WEIGHT: 151.68 LBS | SYSTOLIC BLOOD PRESSURE: 135 MMHG | DIASTOLIC BLOOD PRESSURE: 78 MMHG | OXYGEN SATURATION: 96 % | RESPIRATION RATE: 15 BRPM | BODY MASS INDEX: 25.27 KG/M2

## 2024-01-02 DIAGNOSIS — E78.00 HYPERCHOLESTEROLEMIA: Primary | ICD-10-CM

## 2024-01-02 DIAGNOSIS — M85.859 OSTEOPENIA OF NECK OF FEMUR, UNSPECIFIED LATERALITY: ICD-10-CM

## 2024-01-02 DIAGNOSIS — M79.651 MUSCULOSKELETAL THIGH PAIN, RIGHT: ICD-10-CM

## 2024-01-02 DIAGNOSIS — R73.03 PREDIABETES: ICD-10-CM

## 2024-01-02 LAB
ALBUMIN SERPL-MCNC: 3.9 G/DL (ref 3.6–5.1)
ALBUMIN/GLOB SERPL: 1.1 {RATIO} (ref 1–2.4)
ALP SERPL-CCNC: 112 UNITS/L (ref 45–117)
ALT SERPL-CCNC: 25 UNITS/L
ANION GAP SERPL CALC-SCNC: 9 MMOL/L (ref 7–19)
AST SERPL-CCNC: 24 UNITS/L
BILIRUB SERPL-MCNC: 0.6 MG/DL (ref 0.2–1)
BUN SERPL-MCNC: 16 MG/DL (ref 6–20)
BUN/CREAT SERPL: 22 (ref 7–25)
CALCIUM SERPL-MCNC: 9.6 MG/DL (ref 8.4–10.2)
CHLORIDE SERPL-SCNC: 106 MMOL/L (ref 97–110)
CHOLEST SERPL-MCNC: 206 MG/DL
CHOLEST/HDLC SERPL: 3.2 {RATIO}
CO2 SERPL-SCNC: 28 MMOL/L (ref 21–32)
CREAT SERPL-MCNC: 0.74 MG/DL (ref 0.51–0.95)
EGFRCR SERPLBLD CKD-EPI 2021: >90 ML/MIN/{1.73_M2}
FASTING DURATION TIME PATIENT: NORMAL H
GLOBULIN SER-MCNC: 3.6 G/DL (ref 2–4)
GLUCOSE SERPL-MCNC: 99 MG/DL (ref 70–99)
HDLC SERPL-MCNC: 65 MG/DL
LDLC SERPL CALC-MCNC: 117 MG/DL
NONHDLC SERPL-MCNC: 141 MG/DL
POTASSIUM SERPL-SCNC: 5.1 MMOL/L (ref 3.4–5.1)
PROT SERPL-MCNC: 7.5 G/DL (ref 6.4–8.2)
SODIUM SERPL-SCNC: 138 MMOL/L (ref 135–145)
TRIGL SERPL-MCNC: 121 MG/DL

## 2024-01-02 PROCEDURE — 80053 COMPREHEN METABOLIC PANEL: CPT | Performed by: CLINICAL MEDICAL LABORATORY

## 2024-01-02 PROCEDURE — 99214 OFFICE O/P EST MOD 30 MIN: CPT | Performed by: INTERNAL MEDICINE

## 2024-01-02 PROCEDURE — 80061 LIPID PANEL: CPT | Performed by: CLINICAL MEDICAL LABORATORY

## 2024-01-02 RX ORDER — ROSUVASTATIN CALCIUM 5 MG/1
5 TABLET, COATED ORAL DAILY
COMMUNITY
Start: 2023-11-02 | End: 2024-01-03 | Stop reason: SDUPTHER

## 2024-01-02 ASSESSMENT — PATIENT HEALTH QUESTIONNAIRE - PHQ9
CLINICAL INTERPRETATION OF PHQ2 SCORE: NO FURTHER SCREENING NEEDED
SUM OF ALL RESPONSES TO PHQ9 QUESTIONS 1 AND 2: 0
1. LITTLE INTEREST OR PLEASURE IN DOING THINGS: NOT AT ALL
2. FEELING DOWN, DEPRESSED OR HOPELESS: NOT AT ALL
SUM OF ALL RESPONSES TO PHQ9 QUESTIONS 1 AND 2: 0

## 2024-01-02 ASSESSMENT — PAIN SCALES - GENERAL: PAINLEVEL: 0

## 2024-01-03 RX ORDER — ROSUVASTATIN CALCIUM 5 MG/1
5 TABLET, COATED ORAL DAILY
Qty: 90 TABLET | Refills: 1 | Status: SHIPPED | OUTPATIENT
Start: 2024-01-03

## 2024-02-05 ENCOUNTER — EXTERNAL RECORD (OUTPATIENT)
Dept: HEALTH INFORMATION MANAGEMENT | Facility: OTHER | Age: 64
End: 2024-02-05

## 2024-02-19 ENCOUNTER — TELEPHONE (OUTPATIENT)
Dept: HEMATOLOGY/ONCOLOGY | Facility: HOSPITAL | Age: 64
End: 2024-02-19

## 2024-02-21 ENCOUNTER — APPOINTMENT (OUTPATIENT)
Dept: HEMATOLOGY/ONCOLOGY | Facility: HOSPITAL | Age: 64
End: 2024-02-21
Attending: INTERNAL MEDICINE
Payer: COMMERCIAL

## 2024-03-04 ENCOUNTER — GENETICS ENCOUNTER (OUTPATIENT)
Dept: GENETICS | Facility: HOSPITAL | Age: 64
End: 2024-03-04
Attending: GENETIC COUNSELOR, MS
Payer: COMMERCIAL

## 2024-03-04 ENCOUNTER — APPOINTMENT (OUTPATIENT)
Dept: HEMATOLOGY/ONCOLOGY | Facility: HOSPITAL | Age: 64
End: 2024-03-04
Attending: GENETIC COUNSELOR, MS
Payer: COMMERCIAL

## 2024-03-04 DIAGNOSIS — Z80.3 FAMILY HISTORY OF BREAST CANCER IN FIRST DEGREE RELATIVE: Primary | ICD-10-CM

## 2024-03-04 DIAGNOSIS — Z84.81 FAMILY HISTORY OF GENETIC DISEASE CARRIER: ICD-10-CM

## 2024-03-04 PROCEDURE — 96040 HC GENETIC COUNSELING EA 30 MIN: CPT | Performed by: GENETIC COUNSELOR, MS

## 2024-03-04 NOTE — PROGRESS NOTES
Reason for visit: Ms. Louis is a 63-year-old woman whose sister was found to be positive for a CHEK2 mutation. Other family members are currently pursuing the option of genetic testing for this familial mutation. She is considering genetic testing for the familial mutation in order to guide her medical care.  Ms. Louis is  and has two teen-aged children.  She is employed in engineering.  Relevant family history:  Ms. Louis reports that based upon her family history of cancer, her sister chose to have a hereditary cancer predisposition multi-gene panel drawn (Simple Beatitae, 60 genes) earlier this year and was identified as having a pathogenic variant with low penetrance detected in the CHEK2 gene.  Her sister shared a copy of her results letter with her (which she brought into the consultation via phone).  Ms. Louis is considering the option to be tested for the known CHEK2 mutation.  Ms. Louis herself has not had any cancer diagnoses, nor have any of her siblings.  Her other sister did genetic testing with negative results.  Both of her parents were diagnosed with malignancies.  Her mother was diagnosed with breast cancer at age 65 and her father with a carcinoid tumor at age 48.  Her paternal grandmother may have had colon cancer at age 80 (she had a colostomy, but she was unclear as to the reason for this).  She is otherwise unaware of malignancies on either side of her family but does have limited family structure on both sides of her family.  She is unaware if any other family members have pursued genetic testing aside from her two sisters.  Her heritage on both sides of her family his Polish and she denies any Ashkenazi Yazidism heritage. She denies any individuals with mental retardation, birth defects or genetic conditions.  She has two children born through gestational carrier, both of whom are biologically hers; a 18 yo son and a 18 yo daughter, both of whom she reports are healthy  and well.  Medical history: Ms. Louis reports that she is in good health.  She was 16 at menarche and is nulliparous, having experienced multiple miscarriages, but does have two biological children born through the use of a gestational carrier.  She has her ovaries and uterus intact and is post-menopausal. She admits to two years' use of hormone replacement therapy and admits to 5 years of oral contraceptive use.  She admits to former tobacco use and to alcohol consumption of one beverages monthly.  She has been consistent with her breast imaging but does not have any current breast-related complaints.  She does have a history of one benign breast biopsy and is aware that she has dense breast tissue.  She has had a colonoscopy at age 52 with negative results.  She denies any history of thyroid issues or any dermatological concerns but does have a personal history of uterine fibroids.    Summary:   We discussed hereditary breast cancer with Ms. Louis because of the association with CHEK2 mutation carriers and an increased risk for breast cancer.  Most breast and/or ovarian cancer is not hereditary; however, hereditary cancer is suspected under certain conditions, such as when breast cancer occurs prior to the age of 50 (or premenopausal), when there are multiple affected family members, when breast cancer occurs in combination with other cancers, especially ovarian cancer, and when cancer appears to be passing from generation to generation, or when an individual has more than one cancer diagnosed.  The risks of a CHEK2 mutation (truncating) are believed to be 25-39% lifetime risk for breast cancer. We reviewed that low-penetrance mutations in CHEK2 carry a much lower risk and some labs even consider this a VUS and not an actual mutation.    We discussed dominant inheritance, the pattern in which most hereditary cancer conditions are inherited.  If an individual has such a cancer predisposing gene mutation,  there is a 50% chance that any offspring will not inherit the mutation and a 50% chance that he or she will inherit it.  Inheriting the mutation does not automatically mean that one will develop cancer, rather that there is an increased chance for developing certain types of cancer.  Cancer predisposing gene mutations can exist in males and females and can be passed on to both male and female offspring.  Given the available information about her sister with a known CHEK2 mutation, this gives her a 50% chance of being a carrier of this same mutation.  However, we also discussed the lower penetrance of this particular CHEK2 mutation and that the actual risks are believed to be far lower than a truncating CHEK2 mutation.   The pros and cons of cancer predisposing gene mutation testing were reviewed with Ms. Louis.  Genetic test results can have significant impact on medical management, planning, screening, surgical decision-making, cancer risk assessment for the patient and relatives, and psychological/emotional well-being.  Mutations in the CHEK2 gene account for a small portion of cases of hereditary breast cancer.    We discussed the benefits and limitations of testing for only the known familial mutation vs. multi-gene panels that include other genes more associated with various cancers.  Panels are an appropriate option for individuals whose history is suggestive of more than one syndrome, and they improve detection rate for identifying the underlying cause of a hereditary cancer.  Limitations of panels include an unknown percentage of variants of unknown significance, as well as an uncertainty of level of risk associated with certain low-penetrance genes, and therefore lack of clear guidelines for risk management of carriers of some of these mutations.  There are panels that assess for mutations in only “high risk” and clinically actionable breast cancer genes as well as larger panels that assess for  mutations in high, moderate and low-penetrance cancer genes.  Genetic testing a panel could yield one of three results: no mutation detected, deleterious mutation detected, or variant of uncertain significance.  The implications of these potential results were reviewed with Ms. Louis.  Conversely, testing for only the known familial mutation would yield either a positive (mutation detected) or negative (no mutation detected) result and gives less expensive results. Given her family history, testing aside from the known familial mutation would NOT be medically indicated.  However, we did review that if chose not to be tested, she could be managed as if she was positive for the familial mutation until proven otherwise.    Additionally, we discussed the federal statutes protecting genetic information and non-discrimination for health insurance or employment (MERCEDES) but we reviewed that life insurance, long term healthcare and disability benefits are not covered by these laws, and therefore, should she be interested in obtaining coverage for any of these, she should do this prior to the testing.  Given the known familial CHEK2 mutation, Ms. Louis does meet NCCN criteria for genetic testing.    After discussing the multiple testing options, Ms. Louis decided that she would like to consider the option of genetic testing and was not inclined to pursue any testing today.  She will consider her options and reach out to me in the near future if she elects to pursue genetic testing.  Plan:  Ms. Louis will plan to consider the option of genetic testing.  If she elects to pursue testing at any point, our office is happy to coordinate this process.  Thank you for referring Ms. Louis for genetic counseling; please do not hesitate to contact our office if you have any questions or concerns, [phone].  Send to: Ramona Roberson MD  Time spent with patient: 75 minutes

## 2024-03-18 ENCOUNTER — EXTERNAL RECORD (OUTPATIENT)
Dept: HEALTH INFORMATION MANAGEMENT | Facility: OTHER | Age: 64
End: 2024-03-18

## 2024-04-09 ENCOUNTER — TELEPHONE (OUTPATIENT)
Dept: OBGYN CLINIC | Facility: CLINIC | Age: 64
End: 2024-04-09

## 2024-04-09 NOTE — TELEPHONE ENCOUNTER
Pt requesting refills of HRT. Pt was advised to stop HRT after 8/15 appointment with ASJ. Per visit note, pt was advised to consult with Dr. Roberson due to possible high risk factors.     Pt states she completed consult and followed up with a genetic counselor, Catina Campos, who reviewed her history and determined she was low risk and advised pt could restart HRT.    Pt requesting HRT prescriptions. Pt was happy with medications previous prescribed, but states she is open to alternate medications or medication routes based on ASJ recommendations.    progesterone 200 MG Oral Cap (Discontinued) 36 capsule 5 8/20/2023 8/28/2023   Sig:   Take 1 capsule (200 mg total) by mouth daily. ON DAYS 1-12 OF EACH CALENDAR MONTH     Route:   Oral              estradiol 0.5 MG Oral Tab (Discontinued) 90 tablet 5 8/20/2023 8/28/2023   Sig:   Take 1 tablet (0.5 mg total) by mouth daily.     Route:   Oral

## 2024-04-18 NOTE — TELEPHONE ENCOUNTER
Pt called and scheduled to see asj on 04/29/24. Pt still would like to see if asj would order her HRT without seeing her in the office. Please advise.      Kristen Porter, RN Registered Nurse Signed4/9/2024     Copy     Pt requesting refills of HRT. Pt was advised to stop HRT after 8/15 appointment with ASJ. Per visit note, pt was advised to consult with Dr. Roberson due to possible high risk factors.      Pt states she completed consult and followed up with a genetic counselor, Catina Campos, who reviewed her history and determined she was low risk and advised pt could restart HRT.     Pt requesting HRT prescriptions. Pt was happy with medications previous prescribed, but states she is open to alternate medications or medication routes based on ASJ recommendations.     progesterone 200 MG Oral Cap (Discontinued) 36 capsule 5 8/20/2023 8/28/2023   Sig:   Take 1 capsule (200 mg total) by mouth daily. ON DAYS 1-12 OF EACH CALENDAR MONTH       Route:   Oral                      estradiol 0.5 MG Oral Tab (Discontinued) 90 tablet 5 8/20/2023 8/28/2023   Sig:   Take 1 tablet (0.5 mg total) by mouth daily.       Route:   Oral

## 2024-04-29 ENCOUNTER — OFFICE VISIT (OUTPATIENT)
Dept: OBGYN CLINIC | Facility: CLINIC | Age: 64
End: 2024-04-29

## 2024-04-29 VITALS
HEART RATE: 68 BPM | HEIGHT: 66 IN | BODY MASS INDEX: 24.75 KG/M2 | WEIGHT: 154 LBS | SYSTOLIC BLOOD PRESSURE: 134 MMHG | DIASTOLIC BLOOD PRESSURE: 85 MMHG

## 2024-04-29 DIAGNOSIS — N95.1 MENOPAUSAL SYMPTOMS: Primary | ICD-10-CM

## 2024-04-29 NOTE — PROGRESS NOTES
HPI:   Alice Louis is a 64 year old female who presents for a consultation for restarting of hormone replacement therapy.  Patient is status post consultation with Dr. Jeovanny Roberson on November 2023.  Patient is status post consultation with Catina Campos genetic counselor.  Patient has been considering her options regarding hormone replacement therapy we discussed her family history as well as her discussion with Catina Campos as well as Dr. Roberson.  After extensive discussion the patient is to see Dr. Roberson for a follow-up consultation after which possible HRT or other therapy will be discussed with Dr. Roberson by the patient at her follow-up counseled.  All questions were answered    Wt Readings from Last 6 Encounters:   04/29/24 154 lb (69.9 kg)   11/17/23 155 lb (70.3 kg)   10/06/23 156 lb 3.2 oz (70.9 kg)   08/15/23 153 lb (69.4 kg)   08/15/22 153 lb (69.4 kg)   01/04/22 155 lb (70.3 kg)     Body mass index is 24.86 kg/m².     No results found for: \"CHOLEST\", \"HDL\", \"LDL\", \"TRIGLY\", \"AST\", \"ALT\"     Current Outpatient Medications   Medication Sig Dispense Refill    Multiple Vitamins-Minerals (MULTIVITAMIN ADULTS 50+ OR) Take by mouth daily.      rosuvastatin 5 MG Oral Tab Take 1 tablet (5 mg total) by mouth nightly.      progesterone 200 MG Oral Cap  (Patient not taking: Reported on 4/29/2024)      estradiol 0.5 MG Oral Tab  (Patient not taking: Reported on 4/29/2024)      LORazepam 0.5 MG Oral Tab Take one 60 minutes prior to MRI and may have second dose if needed 30 minutes prior to MRI.  Will need a . (Patient not taking: Reported on 4/29/2024) 2 tablet 0      Past Medical History:    PONV (postoperative nausea and vomiting)      Past Surgical History:   Procedure Laterality Date    D & c      Laparoscopy,diagnostic      Needle biopsy right      around 2010      Family History   Problem Relation Age of Onset    Cancer Father 48        metastatic neuroendocrine tumor    Hypertension Father      Stroke Mother     Cancer Mother 65        breast    Hypertension Mother     Breast Cancer Mother         65    Cancer Paternal Grandmother 80        ?CRC-had colostomy    Genetic Disease Sister         CHEK2 low penetrance mutation (Invitae)      Social History:   Social History     Socioeconomic History    Marital status:    Tobacco Use    Smoking status: Former     Types: Cigarettes    Smokeless tobacco: Never   Vaping Use    Vaping status: Never Used   Substance and Sexual Activity    Alcohol use: Yes     Comment: rarely    Drug use: No     Social Determinants of Health     Physical Activity: Insufficiently Active (2/17/2021)    Received from [facility]    Exercise Vital Sign     Days of Exercise per Week: 4 days     Minutes of Exercise per Session: 30 min    [facility]    Social Connections    [facility]    Housing Stability            REVIEW OF SYSTEMS:   GENERAL: feels well otherwise  SKIN: denies any unusual skin lesions  EYES:denies blurred vision or double vision  HEENT: denies nasal congestion, sinus pain or ST  LUNGS: denies shortness of breath with exertion  CARDIOVASCULAR: denies chest pain on exertion  GI: denies abdominal pain,denies heartburn  : denies dysuria, vaginal discharge or itching,periods regular   MUSCULOSKELETAL: denies back pain  NEURO: denies headaches  PSYCHE: denies depression or anxiety  HEMATOLOGIC: denies hx of anemia  ENDOCRINE: denies thyroid history  ALL/ASTHMA: denies hx of allergy or asthma    EXAM:   /85   Pulse 68   Ht 5' 6\" (1.676 m)   Wt 154 lb (69.9 kg)   BMI 24.86 kg/m²   Body mass index is 24.86 kg/m².   GENERAL: well developed, well nourished,in no apparent distress  SKIN: no rashes,no suspicious lesions  HEENT: atraumatic, normocephalic  EYES:normal in appearance    NEURO: Oriented  times three    Narrative   PROCEDURE: [facility] NILO 2D+3D SCRN BILAT SELF-REQUEST(HAS PCP)(CPT=77067/56786)     COMPARISON: External Exams, [facility] NILO 2D+3D SCREENING BILAT ([id number]), 11/10/2017, 2:06 PM.  External Exams, [facility] NILO 2D+3D SCREENING BILAT ([id number]), 4/04/2019, 12:37 PM.  [facility], [facility] NILO 2D+3D DIAGNOSTIC [facility] BILAT  (KOC=[id number]), 2/24/2021, 7:33 AM.     INDICATIONS: Z12.31 Encounter for screening mammogram for malignant neoplasm of breast     NOTE:   This patient identified you as their physician.  If this is not correct, please contact the Radiology Administrative Office at [phone].  For your convenience, the patient's name, address and phone number is as follows:  MARISSA GUERIN, [address] [place] [id number], [phone]                BREAST COMPOSITION:   Category c-Heterogeneously dense, which may obscure small masses.        TECHNIQUE:   Full field direct screening mammography was performed and images were reviewed with the [employer]9 BERNABE 1.5.1.5 CAD device.  3D tomosynthesis was performed and reviewed.          FINDINGS: There is no suspicious asymmetry, mass, architectural distortion, or microcalcifications identified in either breast.                    Impression   CONCLUSION: There is no mammographic evidence of malignancy in either breast. As long as patient's clinical breast exam remains unchanged, annual screening mammogram is recommended.     BI-RADS CATEGORY:    DIAGNOSTIC CATEGORY 1--NEGATIVE ASSESSMENT.       RECOMMENDATIONS:  ROUTINE MAMMOGRAM AND CLINICAL EVALUATION IN 12 MONTHS.                            PLEASE NOTE: NORMAL MAMMOGRAM DOES NOT EXCLUDE THE POSSIBILITY OF BREAST CANCER.  A CLINICALLY SUSPICIOUS PALPABLE LUMP SHOULD BE BIOPSIED.       For patients over the age of 40, the target due date for the patient's next mammogram has been entered into a reminder system.       Patient received a discharge summary from the  technologist after completion of exam.     Breast marker legend used on images    Triangle = Palpable lump  [place] = Skin tag or mole  BB = Nipple  Linear zulay = Scar  Square = Pain           Dictated by (CST): Rosario Casey DO on 2/09/2023 at 9:52 AM        Narrative   PROCEDURE: [facility] NILO 2D+3D DIAGNOSTIC [place] BILAT (CPT=[id number])     COMPARISON: External Exams, [facility] NILO 2D+3D SCREENING BILAT ([id number]), 11/10/2017, 2:06 PM.  External Exams, [facility] NILO 2D+3D SCREENING BILAT ([id number]), 4/04/2019, 12:37 PM.     INDICATIONS: Painful breasts     TECHNIQUE: Digital diagnostic mammography was performed and images were reviewed with the [employer] 1.5.1.5 CAD device.  3D tomosynthesis was performed and reviewed.        BREAST COMPOSITION:   Category c-Heterogeneously dense, which may obscure small masses.        FINDINGS: The parenchymal pattern in both breasts is not significantly changed.  A biopsy clip is again seen in the upper outer right breast.  No new suspicious mass, calcification or distortion noted in either breast.                  Impression   CONCLUSION:  No mammographic findings suggestive of malignancy.     BI-RADS CATEGORY:    DIAGNOSTIC CATEGORY 2--BENIGN FINDING:       RECOMMENDATIONS:  ROUTINE MAMMOGRAM AND CLINICAL EVALUATION IN 12 MONTHS.       CLINICAL EVALUATION.      The density of the patient's breast tissue reduces mammographic sensitivity for detecting breast cancer. In the setting of a normal screening mammogram, supplemental screening with either whole breast ultrasound or screening breast MRI, used as an  adjunct to mammography, can detect breast cancers that might be obscured by dense breast tissue on mammography.  Because of the density of the patient's breasts on mammography, the patient may benefit from supplemental screening with either whole breast  ultrasound or screening breast MRI based on patient's risk factors.  Please call Scheduling at [phone], if you would like to  make an appointment.                    PLEASE NOTE: NORMAL MAMMOGRAM DOES NOT EXCLUDE THE POSSIBILITY OF BREAST CANCER.  A CLINICALLY SUSPICIOUS PALPABLE LUMP SHOULD BE BIOPSIED.       For patients over the age of 40, the target due date for the patient's next mammogram has been entered into a reminder system.       Patient received a discharge summary from the technologist after completion of exam.     Breast marker legend used on images    Triangle = Palpable lump  [place] = Skin tag or mole  BB = Nipple  Linear zulay = Scar  Square = Pain           Dictated by (CST): Melvin Morelos MD on 2/24/2021 at 7:51 AM      Finalized by (CST): Melvin Morelos MD on 2/24/2021 at 7:57 AM       ASSESSMENT AND PLAN:   Alice Louis is a 64 year old female who presents for a consultation for restarting of hormone replacement therapy.  Patient is status post consultation with Dr. Teranlys is still on November 2023.  Patient is status post consultation with Catina Campos genetic counselor.  Patient has been considering her options regarding hormone replacement therapy we discussed her family history as well as her discussion with Catina Campos as well as Dr. Roberson.  After extensive discussion the patient is to see Dr. Roberson for a follow-up consultation after which possible HRT or other therapy will be discussed with Dr. Navarro by the patient at her follow-up consult with dr Roberson.    All questions were answered

## 2024-05-03 ENCOUNTER — CLINICAL ABSTRACT (OUTPATIENT)
Dept: INTERNAL MEDICINE | Age: 64
End: 2024-05-03

## 2024-05-08 ENCOUNTER — OFFICE VISIT (OUTPATIENT)
Dept: HEMATOLOGY/ONCOLOGY | Facility: HOSPITAL | Age: 64
End: 2024-05-08
Attending: GENETIC COUNSELOR, MS
Payer: COMMERCIAL

## 2024-05-08 ENCOUNTER — EXTERNAL RECORD (OUTPATIENT)
Dept: HEALTH INFORMATION MANAGEMENT | Facility: OTHER | Age: 64
End: 2024-05-08

## 2024-05-08 VITALS
WEIGHT: 153 LBS | RESPIRATION RATE: 16 BRPM | OXYGEN SATURATION: 97 % | BODY MASS INDEX: 24.59 KG/M2 | HEIGHT: 66 IN | TEMPERATURE: 98 F | HEART RATE: 77 BPM | DIASTOLIC BLOOD PRESSURE: 75 MMHG | SYSTOLIC BLOOD PRESSURE: 132 MMHG

## 2024-05-08 DIAGNOSIS — Z80.3 FAMILY HISTORY OF BREAST CANCER IN FIRST DEGREE RELATIVE: Primary | ICD-10-CM

## 2024-05-08 DIAGNOSIS — Z84.89 FAMILY HISTORY OF GENETIC DISEASE: ICD-10-CM

## 2024-05-08 DIAGNOSIS — Z12.31 SCREENING MAMMOGRAM FOR BREAST CANCER: ICD-10-CM

## 2024-05-08 DIAGNOSIS — Z12.39 BREAST CANCER SCREENING, HIGH RISK PATIENT: ICD-10-CM

## 2024-05-08 DIAGNOSIS — F41.9 ANXIETY DUE TO INVASIVE PROCEDURE: ICD-10-CM

## 2024-05-08 DIAGNOSIS — Z12.39 BREAST CANCER SCREENING OTHER THAN MAMMOGRAM: ICD-10-CM

## 2024-05-08 PROCEDURE — 99215 OFFICE O/P EST HI 40 MIN: CPT | Performed by: INTERNAL MEDICINE

## 2024-05-08 RX ORDER — TRIAZOLAM 0.12 MG/1
TABLET ORAL
Qty: 2 TABLET | Refills: 0 | Status: SHIPPED | OUTPATIENT
Start: 2024-05-08

## 2024-05-08 NOTE — PROGRESS NOTES
HPI   Alice Louis is a 64 year old female here for follow up of Family history of breast cancer in first degree relative    Family history of genetic disease    Breast cancer screening, high risk patient    Breast cancer screening other than mammogram    Screening mammogram for breast cancer    Anxiety due to invasive procedure    She has no additional breast complaints at this time. On SBE, she denies specific breast lumps, nipple discharge, skin changes or other problems.  She has no prior history of breast disease or breast biopsy.   She does not perform routine SBE.  She has CBE yearly.   States she does have lumpy bumpy breast.     She did not have mammogram in February.  MRI in the fall was not also done.      She states she felt better on HRT and states when she stopped it she did not feel good off it.  States quality of life not good off treatment.  She requested to have the HRT again and her Gyn referred her back to high risk clinic for discussion.      Risk factors for breast cancer:  postmenopausal    Menses age 17.    Menstrual Status:  Post-menopausal    Age at menopause: 50-55         OCP use in the past:  Yes.  Cumulative number of years:  6,   Was on Mirena for 3 yrs prior to having HRT, this was at age 58.  HRT use:  Yes, currently using 1-2 yrs  Fertility treatment:  Yes    Ethnic back ground:    Positive family h/o breast cancer.  First degree relatives:  1  She has a Body mass index is 24.69 kg/m².  History   Alcohol Use    Yes     Comment: rarely     No BRCA testing:  No.  Does have family history of CHECK 2 mutation in 1 sister.   Baseline screening mammogram at age 49.      Breast density category C.  Prior history of breast biopsies:  Yes.   Prior history of atypical hyperplasia (ductal or lobular):  No.  Prior history of thoracic RT <31 y/o:  No      Review of Systems:   Review of Systems - Oncology  Pertinent positives per HPI.      Current Outpatient Medications    Medication Sig Dispense Refill    Triazolam 0.125 MG Oral Tab Take one tablet 1 hour prior to MRI and a second tablet prior to MRI if needed. 2 tablet 0    Multiple Vitamins-Minerals (MULTIVITAMIN ADULTS 50+ OR) Take by mouth daily.      rosuvastatin 5 MG Oral Tab Take 1 tablet (5 mg total) by mouth nightly.      progesterone 200 MG Oral Cap  (Patient not taking: Reported on 4/29/2024)      estradiol 0.5 MG Oral Tab  (Patient not taking: Reported on 4/29/2024)      LORazepam 0.5 MG Oral Tab Take one 60 minutes prior to MRI and may have second dose if needed 30 minutes prior to MRI.  Will need a . (Patient not taking: Reported on 4/29/2024) 2 tablet 0     Allergies:   No Known Allergies    Past Medical History:    PONV (postoperative nausea and vomiting)     Past Surgical History:   Procedure Laterality Date    D & c      Laparoscopy,diagnostic      Needle biopsy right      around 2010     Social History     Socioeconomic History    Marital status:      Spouse name: Not on file    Number of children: Not on file    Years of education: Not on file    Highest education level: Not on file   Occupational History    Not on file   Tobacco Use    Smoking status: Former     Types: Cigarettes    Smokeless tobacco: Never   Vaping Use    Vaping status: Never Used   Substance and Sexual Activity    Alcohol use: Yes     Comment: rarely    Drug use: No    Sexual activity: Not on file   Other Topics Concern    Not on file   Social History Narrative    Not on file     Social Determinants of Health     Financial Resource Strain: Not on file   Food Insecurity: Not on file   Transportation Needs: Not on file   Physical Activity: Insufficiently Active (2/17/2021)    [facility]    Exercise Vital Sign     Days of Exercise per Week: 4 days     Minutes of Exercise per Session: 30 min   Stress: Not on file   Social Connections: Unknown (3/13/2021)    [facility], [facility]    Social Connections     Conversations with friends/family/neighbors per week: Not on file   Housing Stability: Low Risk  (7/17/2021)    Received from [facility], [facility]    Housing Stability     Mortgage Payment Concerns?: Not on file     Number of Places Lived in the Last Year: Not on file     Unstable Housing?: Not on file     Family History   Problem Relation Age of Onset    Cancer Father 48        metastatic neuroendocrine tumor    Hypertension Father     Stroke Mother     Cancer Mother 65        breast    Hypertension Mother     Breast Cancer Mother         65    Cancer Paternal Grandmother 80        ?CRC-had colostomy    Genetic Disease Sister         CHEK2 low penetrance mutation ([employer]ae)         PHYSICAL EXAM:    /75 (BP Location: Left arm, Patient Position: Sitting, Cuff Size: adult)   Pulse 77   Temp 98.1 °F (36.7 °C) (Oral)   Resp 16   Ht 1.676 m (5' 6\")   Wt 69.4 kg (153 lb)   SpO2 97%   BMI 24.69 kg/m²   Wt Readings from Last 6 Encounters:   05/08/24 69.4 kg (153 lb)   04/29/24 69.9 kg (154 lb)   11/17/23 70.3 kg (155 lb)   10/06/23 70.9 kg (156 lb 3.2 oz)   08/15/23 69.4 kg (153 lb)   08/15/22 69.4 kg (153 lb)     Physical Exam  General: Patient is alert, not in acute distress.  HEENT: EOMs intact. PERRL.   Neck: No JVD. No palpable lymphadenopathy. Neck is supple.  Chest: Clear to auscultation.  Breasts:  B breast no masses.  Densities palpated at the UOQ and LOQ.  Unchanged exam from prior visit.  Heart: Regular rate and rhythm.   Abdomen: Soft, non tender with good bowel sounds.  Extremities: No edema.  Neurological: Grossly intact.   Lymphatics: There is no palpable lymphadenopathy throughout in the cervical, supraclavicular, axillary, or inguinal regions.  Psych/Depression: nl        ASSESSMENT/PLAN:     1. Family history of breast cancer in first degree relative    2. Family  history of genetic disease    3. Breast cancer screening, high risk patient    4. Breast cancer screening other than mammogram    5. Screening mammogram for breast cancer    6. Anxiety due to invasive procedure      Risk assessment:    Risk BRCA 1/2 mutation:  BRCAPRO 0.2%, Tyrer-Cuzick v7 0.3%, Tyrer-Cuzick v8 0.3%  5 year risk of breast cancer:  BRCAPRO 1.9%, Tyrer-Cuzick v7 5.6%, Tyrer-Cuzick v8 6.1%, [place] 3.1%, [place] 1.6%  Lifetime risk of breast cancer:  BRCAPRO 8.5%, Tyrer-Cuzick v7 22.7%, Tyrer-Cuzick v8 24.4%, [place] 14.5%, Michael 5.2%    Her estimated risk for developing invasive breast cancer over the next 5- 10 yrs is increasedcompared to her peers.      Assessment:  Alice Louis is a 64 year old female with increased risk for breast cancer based on the models.  Her pretest probability for a BRCA 1/2 mutation is Low based on the above models.  The patient's sister who had a history of breast cancer, has a CHEK2 mutation.  Patient was seen by our genetic counselor on 3/4/2024 and he had a counseling session.  Patient was still considering whether she will want to proceed with genetic testing.    Plan:  Independent of having a genetic mutation that may increase her risk for breast cancer, the patient's current five-year risk for breast cancer is elevated when compared to her peer group. We discussed factors that would further increase her risk for breast cancer over time to include age, future breast biopsy, as well as additional family members that may be diagnosed with breast cancer.  In addition, she is on active treatment with HRT, which she does not desire to stop taking.        We then talked about surveillance and I recommended semiannual breast exams as well as continuing with annual mammography.      Based on the patient having category C breast density, discussed imaging options such as tomosynthesis in lieu of conventional 2D digital mammography which has limitations for detection of breast  cancer due to her increased breast density.    Discussed that NCCN guidelines recommend for women who have a liftetime risk of >20% based on history of LCIS or ADH/ALH or on risk models that are largely dependent on family history MRI of the breast annually is to be considered in the first group or recommended in the latter.      Given her  >20% risk based on the Tyrer-Cuzick model, I recommend that she consider MRI of the breasts annually staggered with the breast imaging as above.     With regard to risk-reducing interventions, we discussed lifestyle modifications including attention to diet, exercise, weight control, moderation in alcohol use, and avoidance of long-term hormone replacement therapy in the future.  HRT discontinuation was discussed.  Patient has discontinued estradiol and progesterone.    Again we discussed the utility of chemoprevention. I reviewed that the NCCN guidelines which recommends trisk reduction agents for women pre or post menopausal, >/= 36 y/o with a [place] Model 5 yr breast cancer risk of >/ 1.7% or a history of LCIS.      Her [place] Model Risk is 3.1%, therefore she would benefit from chemoprophylaxis.    The following options for chemoprophylaxis and benefits were discussed:  --Tamoxifen 20 mg a day for 5 yrs shown to reduce the risk of breast cancer by 49% and among women with h/o atypical hyperplasia, same dose and duration was associated with a risk reduction of 86% reduction in breast cancer risk.  FDA approved the use of Tamoxifen for breast cancer risk reduction in premenopausal women at increased risk for breast cancer based upon the results of the NSABP Breast Cancer Prevention Trial in 1998. The criteria for inclusion included a 5 year risk of breast cancer greater than 1.7% based on the [place] Model which in order to establish a favorable risk versus benefit profile.   --Raloxifine at 60 mg for post-menopausal women was found to be equivalent to tamoxifen for breast cancer risk  reduction in the initial comparison, in the long-term f/u it appears to be less efficacious in risk reduction than tamoxifen.  Consideration of toxicity may still lead to the choice of raloxifine over tamoxifen in women with intact uterus.    --Exemestane for post-menopausal women in a large single randomizes study in women with LCIS was found to reduce the relative incidence of invasive breast cancer by 65% at 3 yr median f/u.  --Anastrozole for post-menopausal women was found to reduce the relative incidence of breast cancer by 53% at a 5 yr median f/u.     Patient verbalized understanding of the above, however, she again declines chemoprophylaxis, and will like to continue with HRT, due to her QOL.  She will discuss with her gynecologist, consideration of a trial of 6 months of HRT, and then decide if she wants to continue, or she will like to pursue chemoprophylaxis with either low-dose tamoxifen or raloxifene    She was due for breast imaging with tomosynthesis in which will be due in February 2024.  Mammogram on February 2024.  She was encouraged to have this performed soon as possible.  Patient was also referred for last visit for MRI of the breast, she was prescribed lorazepam due to claustrophobia, in order to complete the MRI.  She has not had this performed either.      The patient will like to proceed with having imaging surveillance and  would like to proceed with stagger the patient's imaging so that they are 6 months apart.    She has been encouraged to contact the office with any questions/concerns prior to her next appointment.      Return in about 6 months (around 11/8/2024) for follow-up, with imaging.      No orders of the defined types were placed in this encounter.      Total time spent in the visit between chart review, with over 30 minutes of direct patient time with counseling, of 46 minutes.    Results From Past 48 Hours:  No results found for this or any previous visit (from the past 48  hour(s)).      Imaging & Referrals:  None   No orders of the defined types were placed in this encounter.        Narrative   PROCEDURE: [facility] NILO 2D+3D SCRN BILAT SELF-REQUEST(HAS PCP)(CPT=77067/01350)      COMPARISON: External Exams, [facility] NILO 2D+3D SCREENING BILAT ([id number]), 11/10/2017, 2:06 PM.  External Exams, [facility] NILO 2D+3D SCREENING BILAT ([id number]), 4/04/2019, 12:37 PM.  [facility], [facility] NILO 2D+3D DIAGNOSTIC [facility] BILAT   (ULH=[id number]), 2/24/2021, 7:33 AM.      INDICATIONS: Z12.31 Encounter for screening mammogram for malignant neoplasm of breast      NOTE:   This patient identified you as their physician.  If this is not correct, please contact the Radiology Administrative Office at [phone].  For your convenience, the patient's name, address and phone number is as follows:   MARISSA GUERIN, [address] [place] [id number], [phone]                 BREAST COMPOSITION:   Category c-Heterogeneously dense, which may obscure small masses.         TECHNIQUE:   Full field direct screening mammography was performed and images were reviewed with the [employer] BERNABE 1.5.1.5 CAD device.  3D tomosynthesis was performed and reviewed.          FINDINGS: There is no suspicious asymmetry, mass, architectural distortion, or microcalcifications identified in either breast.                    Impression   CONCLUSION: There is no mammographic evidence of malignancy in either breast. As long as patient's clinical breast exam remains unchanged, annual screening mammogram is recommended.      BI-RADS CATEGORY:     DIAGNOSTIC CATEGORY 1--NEGATIVE ASSESSMENT.       RECOMMENDATIONS:   ROUTINE MAMMOGRAM AND CLINICAL EVALUATION IN 12 MONTHS.                            PLEASE NOTE: NORMAL MAMMOGRAM DOES NOT EXCLUDE THE POSSIBILITY OF BREAST CANCER.  A CLINICALLY SUSPICIOUS PALPABLE LUMP SHOULD BE BIOPSIED.       For patients over the age of 40, the target due date for the patient's next  mammogram has been entered into a reminder system.       Patient received a discharge summary from the technologist after completion of exam.      Breast marker legend used on images    Triangle = Palpable lump   [place] = Skin tag or mole   BB = Nipple   Linear zulay = Scar   Square = Pain            Dictated by (CST): Rosario Casey DO on 2/09/2023 at 9:52 AM       Finalized by (CST): Rosario Casey DO on 2/09/2023 at 9:55 AM

## 2024-05-09 ENCOUNTER — HOSPITAL ENCOUNTER (OUTPATIENT)
Dept: MAMMOGRAPHY | Facility: HOSPITAL | Age: 64
Discharge: HOME | End: 2024-05-09
Attending: INTERNAL MEDICINE
Payer: COMMERCIAL

## 2024-05-09 DIAGNOSIS — Z12.31 SCREENING MAMMOGRAM FOR BREAST CANCER: ICD-10-CM

## 2024-05-09 PROCEDURE — 77067 SCR MAMMO BI INCL CAD: CPT | Performed by: INTERNAL MEDICINE

## 2024-05-09 PROCEDURE — 77063 BREAST TOMOSYNTHESIS BI: CPT | Performed by: INTERNAL MEDICINE

## 2024-07-12 ENCOUNTER — E-ADVICE (OUTPATIENT)
Dept: INTERNAL MEDICINE | Age: 64
End: 2024-07-12

## 2024-08-30 SDOH — HEALTH STABILITY: PHYSICAL HEALTH: ON AVERAGE, HOW MANY MINUTES DO YOU ENGAGE IN EXERCISE AT THIS LEVEL?: 60 MIN

## 2024-08-30 SDOH — ECONOMIC STABILITY: HOUSING INSECURITY: DO YOU HAVE PROBLEMS WITH ANY OF THE FOLLOWING?: NONE OF THE ABOVE

## 2024-08-30 SDOH — ECONOMIC STABILITY: TRANSPORTATION INSECURITY
IN THE PAST 12 MONTHS, HAS LACK OF RELIABLE TRANSPORTATION KEPT YOU FROM MEDICAL APPOINTMENTS, MEETINGS, WORK OR FROM GETTING THINGS NEEDED FOR DAILY LIVING?: NO

## 2024-08-30 SDOH — ECONOMIC STABILITY: GENERAL

## 2024-08-30 SDOH — ECONOMIC STABILITY: FOOD INSECURITY: WITHIN THE PAST 12 MONTHS, THE FOOD YOU BOUGHT JUST DIDN'T LAST AND YOU DIDN'T HAVE MONEY TO GET MORE.: NEVER TRUE

## 2024-08-30 SDOH — HEALTH STABILITY: PHYSICAL HEALTH: ON AVERAGE, HOW MANY DAYS PER WEEK DO YOU ENGAGE IN MODERATE TO STRENUOUS EXERCISE (LIKE A BRISK WALK)?: 6 DAYS

## 2024-08-30 SDOH — ECONOMIC STABILITY: HOUSING INSECURITY: WHAT IS YOUR LIVING SITUATION TODAY?: I HAVE A STEADY PLACE TO LIVE

## 2024-08-30 SDOH — SOCIAL STABILITY: SOCIAL NETWORK
HOW OFTEN DO YOU SEE OR TALK TO PEOPLE THAT YOU CARE ABOUT AND FEEL CLOSE TO? (FOR EXAMPLE: TALKING TO FRIENDS ON THE PHONE, VISITING FRIENDS OR FAMILY, GOING TO CHURCH OR CLUB MEETINGS): 5 OR MORE TIMES A WEEK

## 2024-08-30 ASSESSMENT — LIFESTYLE VARIABLES: HOW OFTEN DO YOU HAVE A DRINK CONTAINING ALCOHOL: MONTHLY OR LESS

## 2024-08-30 ASSESSMENT — SOCIAL DETERMINANTS OF HEALTH (SDOH): IN THE PAST 12 MONTHS, HAS THE ELECTRIC, GAS, OIL, OR WATER COMPANY THREATENED TO SHUT OFF SERVICE IN YOUR HOME?: NO

## 2024-09-05 RX ORDER — MELOXICAM 7.5 MG/1
TABLET ORAL
COMMUNITY
End: 2024-09-06 | Stop reason: ALTCHOICE

## 2024-09-05 RX ORDER — TRIAZOLAM 0.12 MG/1
TABLET ORAL
COMMUNITY
End: 2024-09-06 | Stop reason: ALTCHOICE

## 2024-09-05 RX ORDER — ROSUVASTATIN CALCIUM 5 MG/1
1 TABLET, COATED ORAL DAILY
COMMUNITY

## 2024-09-05 RX ORDER — IBUPROFEN 800 MG/1
1 TABLET, FILM COATED ORAL EVERY 6 HOURS PRN
COMMUNITY
End: 2024-09-06 | Stop reason: ALTCHOICE

## 2024-09-05 RX ORDER — DIAZEPAM 10 MG
TABLET ORAL
COMMUNITY
End: 2024-09-06 | Stop reason: ALTCHOICE

## 2024-09-05 RX ORDER — AMOXICILLIN 500 MG/1
CAPSULE ORAL
COMMUNITY
End: 2024-09-06 | Stop reason: ALTCHOICE

## 2024-09-06 ENCOUNTER — APPOINTMENT (OUTPATIENT)
Dept: OBGYN | Age: 64
End: 2024-09-06

## 2024-09-06 VITALS
BODY MASS INDEX: 25.09 KG/M2 | HEART RATE: 73 BPM | HEIGHT: 65 IN | WEIGHT: 150.57 LBS | SYSTOLIC BLOOD PRESSURE: 128 MMHG | DIASTOLIC BLOOD PRESSURE: 84 MMHG | OXYGEN SATURATION: 98 %

## 2024-09-06 DIAGNOSIS — Z71.89 COUNSELING FOR HORMONE REPLACEMENT THERAPY: Primary | ICD-10-CM

## 2024-09-06 DIAGNOSIS — Z23 NEED FOR INFLUENZA VACCINATION: ICD-10-CM

## 2024-09-06 RX ORDER — DIAZEPAM 5 MG
TABLET ORAL
COMMUNITY
Start: 2024-08-20

## 2024-09-06 ASSESSMENT — PATIENT HEALTH QUESTIONNAIRE - PHQ9
2. FEELING DOWN, DEPRESSED OR HOPELESS: NOT AT ALL
CLINICAL INTERPRETATION OF PHQ2 SCORE: NO FURTHER SCREENING NEEDED
SUM OF ALL RESPONSES TO PHQ9 QUESTIONS 1 AND 2: 0
SUM OF ALL RESPONSES TO PHQ9 QUESTIONS 1 AND 2: 0
1. LITTLE INTEREST OR PLEASURE IN DOING THINGS: NOT AT ALL

## 2024-09-10 ENCOUNTER — E-ADVICE (OUTPATIENT)
Dept: OBGYN | Age: 64
End: 2024-09-10

## 2024-09-10 DIAGNOSIS — N95.1 SYMPTOMATIC MENOPAUSAL OR FEMALE CLIMACTERIC STATES: Primary | ICD-10-CM

## 2024-09-10 RX ORDER — ESTRADIOL 0.1 MG/G
1 CREAM VAGINAL
Qty: 42.5 G | Refills: 1 | Status: SHIPPED | OUTPATIENT
Start: 2024-09-12

## 2024-09-10 RX ORDER — PAROXETINE 7.5 MG/1
7.5 CAPSULE ORAL DAILY
Qty: 30 CAPSULE | Refills: 11 | Status: SHIPPED | OUTPATIENT
Start: 2024-09-10

## 2024-11-01 NOTE — PATIENT INSTRUCTIONS
Refill policies:    Allow 2-3 business days for refills; controlled substances may take longer.  Contact your pharmacy at least 5 days prior to running out of medication and have them send an electronic request or submit request through the “request refill” option in your [employer] account.  Refills are not addressed on weekends; covering physicians do not authorize routine medications on weekends.  No narcotics or controlled substances are refilled after noon on Fridays or by on call physicians.  By law, narcotics must be electronically prescribed.  A 30 day supply with no refills is the maximum allowed.  If your prescription is due for a refill, you may be due for a follow up appointment.  To best provide you care, patients receiving routine medications need to be seen at least once a year.  Patients receiving narcotic/controlled substance medications need to be seen at least once every 3 months.  In the event that your preferred pharmacy does not have the requested medication in stock (e.g. Backordered), it is your responsibility to find another pharmacy that has the requested medication available.  We will gladly send a new prescription to that pharmacy at your request.    Scheduling Tests:    If your physician has ordered radiology tests such as MRI or CT scans, please contact Central Scheduling at [phone] right away to schedule the test.  Once scheduled, the [facility] Centralized Referral Team will work with your insurance carrier to obtain pre-certification or prior authorization.  Depending on your insurance carrier, approval may take 3-10 days.  It is highly recommended patients assure they have received an authorization before having a test performed.  If test is done without insurance authorization, patient may be responsible for the entire amount billed.      Precertification and Prior Authorizations:  If your physician has recommended that you have a procedure or additional testing performed the [facility]  Centralized Referral Team will contact your insurance carrier to obtain pre-certification or prior authorization.    You are strongly encouraged to contact your insurance carrier to verify that your procedure/test has been approved and is a COVERED benefit.  Although the [facility] Centralized Referral Team does its due diligence, the insurance carrier gives the disclaimer that \"Although the procedure is authorized, this does not guarantee payment.\"    Ultimately the patient is responsible for payment.   Thank you for your understanding in this matter.  Paperwork Completion:  If you require FMLA or disability paperwork for your recovery, please make sure to either drop it off or have it faxed to our office at [phone]. Be sure the form has your name and date of birth on it.  The form will be faxed to our Forms Department and they will complete it for you.  There is a 25$ fee for all forms that need to be filled out.  Please be aware there is a 10-14 day turnaround time.  You will need to sign a release of information (SKY) form if your paperwork does not come with one.  You may call the Forms Department with any questions at [phone].  Their fax number is [phone].

## 2024-11-04 ENCOUNTER — OFFICE VISIT (OUTPATIENT)
Dept: SURGERY | Facility: CLINIC | Age: 64
End: 2024-11-04
Payer: COMMERCIAL

## 2024-11-04 ENCOUNTER — HOSPITAL ENCOUNTER (OUTPATIENT)
Dept: GENERAL RADIOLOGY | Facility: HOSPITAL | Age: 64
Discharge: HOME | End: 2024-11-04
Attending: STUDENT IN AN ORGANIZED HEALTH CARE EDUCATION/TRAINING PROGRAM
Payer: COMMERCIAL

## 2024-11-04 VITALS
HEIGHT: 66 IN | HEART RATE: 66 BPM | BODY MASS INDEX: 24.11 KG/M2 | WEIGHT: 150 LBS | SYSTOLIC BLOOD PRESSURE: 116 MMHG | OXYGEN SATURATION: 99 % | DIASTOLIC BLOOD PRESSURE: 76 MMHG

## 2024-11-04 DIAGNOSIS — M54.41 CHRONIC BILATERAL LOW BACK PAIN WITH BILATERAL SCIATICA: Primary | ICD-10-CM

## 2024-11-04 DIAGNOSIS — M54.42 CHRONIC BILATERAL LOW BACK PAIN WITH BILATERAL SCIATICA: Primary | ICD-10-CM

## 2024-11-04 DIAGNOSIS — G89.29 CHRONIC BILATERAL LOW BACK PAIN WITH BILATERAL SCIATICA: Primary | ICD-10-CM

## 2024-11-04 DIAGNOSIS — M54.42 CHRONIC BILATERAL LOW BACK PAIN WITH BILATERAL SCIATICA: ICD-10-CM

## 2024-11-04 DIAGNOSIS — M54.41 CHRONIC BILATERAL LOW BACK PAIN WITH BILATERAL SCIATICA: ICD-10-CM

## 2024-11-04 DIAGNOSIS — G89.29 CHRONIC BILATERAL LOW BACK PAIN WITH BILATERAL SCIATICA: ICD-10-CM

## 2024-11-04 PROCEDURE — 3008F BODY MASS INDEX DOCD: CPT | Performed by: STUDENT IN AN ORGANIZED HEALTH CARE EDUCATION/TRAINING PROGRAM

## 2024-11-04 PROCEDURE — 3078F DIAST BP <80 MM HG: CPT | Performed by: STUDENT IN AN ORGANIZED HEALTH CARE EDUCATION/TRAINING PROGRAM

## 2024-11-04 PROCEDURE — 99204 OFFICE O/P NEW MOD 45 MIN: CPT | Performed by: STUDENT IN AN ORGANIZED HEALTH CARE EDUCATION/TRAINING PROGRAM

## 2024-11-04 PROCEDURE — 72114 X-RAY EXAM L-S SPINE BENDING: CPT | Performed by: STUDENT IN AN ORGANIZED HEALTH CARE EDUCATION/TRAINING PROGRAM

## 2024-11-04 PROCEDURE — 3074F SYST BP LT 130 MM HG: CPT | Performed by: STUDENT IN AN ORGANIZED HEALTH CARE EDUCATION/TRAINING PROGRAM

## 2024-11-04 RX ORDER — MELOXICAM 7.5 MG/1
TABLET ORAL
COMMUNITY

## 2024-11-04 RX ORDER — IBUPROFEN 800 MG/1
1 TABLET, FILM COATED ORAL EVERY 6 HOURS PRN
COMMUNITY

## 2024-11-04 RX ORDER — ESTRADIOL 0.03 MG/D
1 FILM, EXTENDED RELEASE TRANSDERMAL
COMMUNITY
Start: 2024-10-21

## 2024-11-04 RX ORDER — PROGESTERONE 100 MG/1
CAPSULE ORAL
COMMUNITY
Start: 2024-10-22

## 2024-11-04 RX ORDER — ESTRADIOL 0.1 MG/G
CREAM VAGINAL
COMMUNITY
Start: 2024-09-10

## 2024-11-04 RX ORDER — DIAZEPAM 10 MG/1
TABLET ORAL
COMMUNITY

## 2024-11-04 RX ORDER — DIAZEPAM 5 MG/1
5 TABLET ORAL
Qty: 1 TABLET | Refills: 0 | Status: SHIPPED | OUTPATIENT
Start: 2024-11-04 | End: 2024-11-04

## 2024-11-04 RX ORDER — PAROXETINE 7.5 MG/1
1 CAPSULE ORAL DAILY
COMMUNITY

## 2024-11-04 RX ORDER — DIAZEPAM 5 MG/1
1 TABLET ORAL AS NEEDED
COMMUNITY
Start: 2024-08-20

## 2024-11-04 RX ORDER — TRIAZOLAM 0.25 MG
TABLET ORAL
COMMUNITY

## 2024-11-04 RX ORDER — ERYTHROMYCIN 5 MG/G
OINTMENT OPHTHALMIC
COMMUNITY
Start: 2024-10-15

## 2024-11-04 NOTE — PROGRESS NOTES
New patient:  Reason for visit: New patient presents to clinic complaining of lower back pain radiating into buttocks and bilateral, back of thigh to knee sharp pain. Patient denies numbness and tingling. Patient states she saw an Orthopedic for her hips and he said she has tendinitis. Patient states that pain is not from tendinitis but believes it stems from back .     Estimated time of onset:  6 months     Numeric Rating Scale:   Pain at Present:  4/10                                                                                                                       Distribution of Pain:    Bilateral     Past Treatments for Current Pain Condition:       No PT   No pain medication  No hx of spinal injections       Prior diagnostic testing related to this condition:  No recent imaging

## 2024-11-04 NOTE — PROGRESS NOTES
New Neurosurgery Patient    Patient: Alice Louis  Medical Record Number: [id number]  YOB: 1960  PCP: Mackenzie Serna  Referring Provider: No ref. provider found.     Reason for visit: Back and leg pain    HISTORY OF PRESENTING ILLNESS:  I had the pleasure of meeting Alice Louis in the neurosurgery clinic today. Alice Louis is a pleasant 64 year old female who presents today with concerns of back and leg pain.  The patient endorses approximately 6 months of symptoms without any known inciting events or traumas.  She describes intermittent bilateral low back pain with radiation into her bilateral buttocks, posterior thighs, and posterior aspect of the knee.  No associated numbness/tingling.  She rates the pain a 4/10 and states that it is intermittent in nature.  She noticed the pain after she had just finished physical therapy for tendinitis in her right hip, for which she was seeing an orthopedic provider.  She states that when she had the tendinitis, the pain was primarily in her right anterior hip.  She feels that this pain is different.  She feels that it is worse with particular movements such as bending and twisting, back extension, and transitioning from squatting to standing position.  Some activities do not cause any pain, such as cycling.  She sometimes feels better when she is moving around.  She has tried ibuprofen, ice, heat, and stretching without much relief.  She has never done PT for her back.  No spinal injections.  She no longer takes any pain medications.  No bowel/bladder incontinence or saddle anesthesia.    Past Medical History:    PONV (postoperative nausea and vomiting)      Past Surgical History:   Procedure Laterality Date    D & c      Laparoscopy,diagnostic      Needle biopsy right      around 2010      Family History   Problem Relation Age of Onset    Cancer Father 48        metastatic neuroendocrine tumor    Hypertension Father     Stroke  Mother     Cancer Mother 65        breast    Hypertension Mother     Breast Cancer Mother         65    Cancer Paternal Grandmother 80        ?CRC-had colostomy    Genetic Disease Sister         CHEK2 low penetrance mutation ([facility])      Social History     Socioeconomic History    Marital status:    Tobacco Use    Smoking status: Former     Types: Cigarettes    Smokeless tobacco: Never   Vaping Use    Vaping status: Never Used   Substance and Sexual Activity    Alcohol use: Yes     Comment: rarely    Drug use: No      Allergies[1]   Current Medications:  Current Outpatient Medications   Medication Sig Dispense Refill    diazePAM 5 MG Oral Tab Take 1 tablet (5 mg total) by mouth as needed.      estradiol 0.025 MG/24HR Transdermal Patch Biweekly Place 1 patch onto the skin twice a week.      progesterone 100 MG Oral Cap       estradiol 0.1 MG/GM Vaginal Cream PLACE 1 GRAM VAGINALLY 2 DAYS A WEEK      erythromycin 5 MG/GM Ophthalmic Ointment APPLY 1 INCH IN BOTH EYES TWICE DAILY FOR 1 WEEK THEN AS NEEDED      ibuprofen 800 MG Oral Tab Take 1 tablet (800 mg total) by mouth every 6 (six) hours as needed.      Meloxicam 7.5 MG Oral Tab Take 1 tablet every day by oral route as directed for 30 days.      amoxicillin clavulanate 875-125 MG Oral Tab Take 1 tablet by mouth 2 (two) times daily.      diazePAM 10 MG Oral Tab TAKE 1 TABLET BY MOUTH AT BEDTIME THE NIGHT BEFORE SURGERY      PARoxetine Mesylate 7.5 MG Oral Cap Take 1 capsule by mouth daily.      Triazolam 0.25 MG Oral Tab TAKE 1 TABLET BY MOUTH ONE HOUR BEFORE APPOINTMENT      Triazolam 0.125 MG Oral Tab Take one tablet 1 hour prior to MRI and a second tablet prior to MRI if needed. 2 tablet 0    progesterone 200 MG Oral Cap  (Patient not taking: Reported on 4/29/2024)      estradiol 0.5 MG Oral Tab  (Patient not taking: Reported on 4/29/2024)      Multiple Vitamins-Minerals (MULTIVITAMIN ADULTS 50+ OR) Take by mouth daily.      rosuvastatin 5 MG Oral Tab  Take 1 tablet (5 mg total) by mouth nightly.      LORazepam 0.5 MG Oral Tab Take one 60 minutes prior to MRI and may have second dose if needed 30 minutes prior to MRI.  Will need a . (Patient not taking: Reported on 4/29/2024) 2 tablet 0        REVIEW OF SYSTEMS:  Comprehensive review of systems completed and negative with the exception of aforementioned information in the HPI.     PHYSICAL EXAM:  /76   Pulse 66   Ht 66\"   Wt 150 lb (68 kg)   SpO2 99%   BMI 24.21 kg/m²   Body mass index is 24.21 kg/m².  Wt Readings from Last 6 Encounters:   11/04/24 150 lb (68 kg)   05/08/24 153 lb (69.4 kg)   04/29/24 154 lb (69.9 kg)   11/17/23 155 lb (70.3 kg)   10/06/23 156 lb 3.2 oz (70.9 kg)   08/15/23 153 lb (69.4 kg)        General: Well developed, well nourished, in no acute distress.     HEENT: Normocephalic, atraumatic.    Respirations: Non-labored     Neurologic / Musculoskeletal: Awake, alert, and interactive. Recent and remote memory appear intact. Attention span and concentration are appropriate. No dysarthria. Coordination and motor control grossly intact. Patient follows commands briskly and appropriately.  No SI joint tenderness.  No hip tenderness bilaterally.  Negative ROSELYN test bilaterally.      Lumbar Spine:    Palpation: Spine and paraspinal muscles nontender    Motor / Extremities   Hip   flexion Knee   extension Dorsiflexion EHL Plantarflexion   Sensation   R 5/5 5/5 5/5 5/5 5/5 Intact to light touch   L 5/5 5/5 5/5 5/5 5/5 Intact to light touch     Reflexes:  -Patellar: 2+ and equal bilaterally  -Ankle: 0 and equal bilaterally  -Clonus: Negative    IMAGING:  No neurosurgical imaging to review at this time    ASSESSMENT / PLAN:    ICD-10-CM   1. Chronic bilateral low back pain with bilateral sciatica  M54.42    M54.41    G89.29             Patient is a 64 year old female who presents to the office today with concerns of chronic bilateral low back pain with radiation into her posterior  thighs.  On exam, she has full strength.  Ankle DTR 0+.  No hip tenderness or ROSELYN signs bilaterally.  She has a history of right hip tendinitis, which she feels the pain that she is experiencing today is different from this.  She is otherwise neurologically intact.  Will plan to send her for lumbar x-rays and flexion/extension to rule out dynamic instability contributing to her pain.  Will also plan to obtain a lumbar MRI without contrast to rule out neural compression contributing to her symptoms.  In the interim, she will begin physical therapy for strength and stretching exercises.    -Medications Prescribed: Valium 1 time as needed  -Imaging Ordered: XR lumbar spine AP/lateral/flexion/extension, MRI lumbar spine without contrast  -Referrals Placed: Physical therapy  -Follow up: After MRI    Plan was reviewed and discussed in detail with the patient. Patient encouraged to call the office with any questions or concerns of new/worsening neurologic symptoms. Patient demonstrated good understanding and was agreeable with the plan.     Visit time: 45 minutes   Over 50% of that time was spent providing patient education and discussing care plan.    Jose Allen PA-C  Physician Assistant- Neurosurgery   [facility]  11/4/2024, 3:22 PM             [1] No Known Allergies

## 2024-11-05 DIAGNOSIS — Z12.39 BREAST CANCER SCREENING OTHER THAN MAMMOGRAM: ICD-10-CM

## 2024-11-05 DIAGNOSIS — Z12.39 BREAST CANCER SCREENING, HIGH RISK PATIENT: Primary | ICD-10-CM

## 2024-11-07 ENCOUNTER — PATIENT MESSAGE (OUTPATIENT)
Dept: SURGERY | Facility: CLINIC | Age: 64
End: 2024-11-07

## 2024-11-07 ENCOUNTER — TELEPHONE (OUTPATIENT)
Dept: PHYSICAL THERAPY | Age: 64
End: 2024-11-07

## 2024-11-07 ENCOUNTER — APPOINTMENT (OUTPATIENT)
Dept: HEMATOLOGY/ONCOLOGY | Facility: HOSPITAL | Age: 64
End: 2024-11-07
Attending: INTERNAL MEDICINE
Payer: COMMERCIAL

## 2024-11-07 DIAGNOSIS — G89.29 CHRONIC BILATERAL LOW BACK PAIN WITH BILATERAL SCIATICA: Primary | ICD-10-CM

## 2024-11-07 DIAGNOSIS — M54.42 CHRONIC BILATERAL LOW BACK PAIN WITH BILATERAL SCIATICA: Primary | ICD-10-CM

## 2024-11-07 DIAGNOSIS — M54.41 CHRONIC BILATERAL LOW BACK PAIN WITH BILATERAL SCIATICA: Primary | ICD-10-CM

## 2024-11-07 NOTE — TELEPHONE ENCOUNTER
Patient saw CHAVA Allen on 11-4-24.  Per OV note:  \"-Medications Prescribed: Valium 1 time as needed  -Imaging Ordered: XR lumbar spine AP/lateral/flexion/extension, MRI lumbar spine without contrast  -Referrals Placed: Physical therapy  -Follow up: After MRI\"    She completed the x-ray after the visit.  Please review and advise.

## 2024-11-08 NOTE — TELEPHONE ENCOUNTER
Message received from pt.    Advised pt to clarify if she is unable to get a PT appointment until December. Advised pt if she would like a sooner appointment she should contact external facilities to see when she would be able to get in. If pt decides to go with external PT advised pt we will need the name, phone number and fax number to fax the order.

## 2024-11-08 NOTE — TELEPHONE ENCOUNTER
Message received from pt.    Pt requesting recommendations on external PT other than [facility].     Routed to Provider.

## 2024-11-08 NOTE — TELEPHONE ENCOUNTER
Message below noted.    Advised pt of message and to reach back out once she has chosen a facility that is the most convenient for her. Once we get a facility name, phone number, and fax number we can fax the order.

## 2024-11-14 ENCOUNTER — HOSPITAL ENCOUNTER (OUTPATIENT)
Dept: MRI IMAGING | Age: 64
Discharge: HOME | End: 2024-11-14
Attending: STUDENT IN AN ORGANIZED HEALTH CARE EDUCATION/TRAINING PROGRAM
Payer: COMMERCIAL

## 2024-11-14 DIAGNOSIS — M54.41 CHRONIC BILATERAL LOW BACK PAIN WITH BILATERAL SCIATICA: ICD-10-CM

## 2024-11-14 DIAGNOSIS — G89.29 CHRONIC BILATERAL LOW BACK PAIN WITH BILATERAL SCIATICA: ICD-10-CM

## 2024-11-14 DIAGNOSIS — M54.42 CHRONIC BILATERAL LOW BACK PAIN WITH BILATERAL SCIATICA: ICD-10-CM

## 2024-11-14 PROCEDURE — 72148 MRI LUMBAR SPINE W/O DYE: CPT | Performed by: STUDENT IN AN ORGANIZED HEALTH CARE EDUCATION/TRAINING PROGRAM

## 2024-11-15 ENCOUNTER — PATIENT MESSAGE (OUTPATIENT)
Dept: SURGERY | Facility: CLINIC | Age: 64
End: 2024-11-15

## 2024-11-18 ENCOUNTER — TELEPHONE (OUTPATIENT)
Dept: PHYSICAL THERAPY | Facility: HOSPITAL | Age: 64
End: 2024-11-18

## 2024-11-18 NOTE — TELEPHONE ENCOUNTER
Noted that patient has messaged with CHAVA Loo and is asking if disc replacement surgery is an option.  Reply sent, informing patient that this will be discussed with Dr. Saravia after she completes recommended PT and CT scan.

## 2024-11-18 NOTE — TELEPHONE ENCOUNTER
Noted that provider has messaged patient and she has responded with follow up questions.     -should she start Physical Therapy?  -Will PT \"help\"?  -does she need to avoid particular activities?  -what are \"possible surgeries\"?  -she will make a follow up appointment.     Noted PT order is in the chart.       Routed to CHAVA Loo.

## 2024-11-20 ENCOUNTER — OFFICE VISIT (OUTPATIENT)
Dept: PHYSICAL THERAPY | Age: 64
End: 2024-11-20
Attending: STUDENT IN AN ORGANIZED HEALTH CARE EDUCATION/TRAINING PROGRAM
Payer: COMMERCIAL

## 2024-11-20 DIAGNOSIS — G89.29 CHRONIC BILATERAL LOW BACK PAIN WITH BILATERAL SCIATICA: Primary | ICD-10-CM

## 2024-11-20 DIAGNOSIS — M54.42 CHRONIC BILATERAL LOW BACK PAIN WITH BILATERAL SCIATICA: Primary | ICD-10-CM

## 2024-11-20 DIAGNOSIS — M54.41 CHRONIC BILATERAL LOW BACK PAIN WITH BILATERAL SCIATICA: Primary | ICD-10-CM

## 2024-11-20 PROCEDURE — 97162 PT EVAL MOD COMPLEX 30 MIN: CPT

## 2024-11-20 PROCEDURE — 97530 THERAPEUTIC ACTIVITIES: CPT

## 2024-11-20 NOTE — PROGRESS NOTES
SPINE EVALUATION:     Diagnosis:   Chronic bilateral low back pain with bilateral sciatica (M54.42,M54.41,G89.29)       Referring Provider: Jose Allen  Date of Evaluation:    11/20/2024    Precautions:  None Next MD visit:   none scheduled  Date of Surgery: n/a     PATIENT SUMMARY   Alice Louis is a  64 year old female who presents today with concerns of back and leg pain. Sudden  onset of pain was 6 months ago without any known inciting events or traumas.  She describes intermittent bilateral low back pain with radiation into her bilateral buttocks, posterior thighs (R)>(L). She rates the pain a 4/10 and states that it is intermittent in nature.   She feels that this pain is different.  She feels that it is worse with particular movements such as bending and twisting, back extension, and transitioning from squatting to standing position.    She sometimes feels better when she is moving around.   Pt describes pain level current 5/10, at best 2/10, at worst 9/10.   Current functional limitations include bed mobility, transfers, standing , walking .   Pain worsens with standing up from squat position ,     Alice describes prior level of function min - moderate discomfort with change in positions, standing up from squats.. Pt goals include to eliminate the pain and be able to play tennis and prevent more damage .  Past medical history was reviewed with Alice. Significant findings include h/o hip pain  Pt denies diplopia, dysarthria, dysphasia, dizziness, drop attacks, bowel/bladder changes, saddle anesthesia, and CATIE LE N/T.    ASSESSMENT  Alice presents to physical therapy evaluation with primary c/o LBP with radiculopathy . The results of the objective tests and measures show leg length discrepancy, positive SI joint dysfunction (R), negative slump test,SLR test, weakness in core muscles and tightness in (B) hip IR/ER .  Functional deficits include but are not limited to bed mobility, transfers,  standing , walking ,standing up from squat position .  Signs and symptoms are consistent with diagnosis of Chronic bilateral low back pain with bilateral sciatica . Pt and PT discussed evaluation findings, pathology, POC and HEP.  Pt voiced understanding and performs HEP correctly without reported pain. Skilled Physical Therapy is medically necessary to address the above impairments and reach functional goals.     OBJECTIVE:   Observation/Posture: forward head, protracted  shoulder s, mild thoracic kyphosis  Neuro Screen: numbness and burning sensation into (B) glutes and post thigh with position changes    Lumbar AROM: (* denotes performed with pain)  Flexion: WFL  Extension: WFL  Sidebending: R WFL; L WFL  Rotation: R WFL; L WFL    Accessory motion: favors (R) LE with standing   Palpation: denies any pain     Strength: (* denotes performed with pain)  LE   Hip flexion (L2): R 5/5; L 5/5  Hip abduction: R 5/5; L 5/5  Hip Extension: R 5/5; L 5/5   Hip ER: R /5; L 5/5  Hip IR: R 5/5; L 5/5  Knee Flexion: R 5/5; L 5/5   Knee extension (L3): R 5/5; L 5/5   DF (L4): R 5/5; L 5/5  Great Toe Ext (L5): R 5/5, L 5/5  PF (S1): R 5/5; L 5/5     Flexibility:   LE   Hip Flexor: WFL  Hamstrings: R WFL; L WFL  Piriformis: R min tightness; L min tightness  Quads: R WFL; L WFL  Gastroc-soleus: R WFL; L WFL     Special tests:   Seated slump test : Negative  Supine SLR: Positive  (B)  SI joint dysfunction: Positive (R)     Gait: pt ambulates on level ground with decreased step length   and decreased stance phase   .  Balance: SLS R 15 sec, L 15 sec     Today’s Treatment and Response:   Pt education was provided on exam findings, treatment diagnosis, treatment plan, expectations, and prognosis. Pt was also provided recommendations for activity modifications, possible soreness after evaluation, modalities as needed [ice/heat], postural corrections, ergonomics, pain science education , detrimental fear avoidance behaviors, and  importance of remaining active  Patient was instructed in and issued a HEP for: core activation . Lumbar extension based exercises to centralize symptoms    Charges: PT Eval Moderate Complexity: 1 : TA: 2       Total Timed Treatment: 45  min     Total Treatment Time: 46 min     Based on clinical rationale and outcome measures, this evaluation involved Moderate Complexity decision making due to 1-2 personal factors/comorbidities, 3 body structures involved/activity limitations, and unstable symptoms including changing pain levels.  PLAN OF CARE:    Goals: (to be met in 10 visits)   Pt will improve transversus abdominis recruitment to perform proper isometric contraction without requiring verbal or tactile cuing to promote advancement of therex   Pt will demonstrate good understanding of proper posture and body mechanics to decrease pain and improve spinal safety   Pt will achieve 5/5 MMT in core muscles to be able to stand up from squat position without any discomfort or radicular symptoms  Pt will report improved symptom centralization and absence of radicular symptoms for 3 consecutive days to improve function with ADL   Pt will have decreased paraspinal mm tension to tolerate standing >45 minutes for work and home activities   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  Pt will be independent and compliant with comprehensive HEP to maintain progress achieved in PT     Frequency / Duration: Patient will be seen for 2 x/week or a total of 10 visits over a 90 day period. Treatment will include: Manual Therapy, Neuromuscular Re-education, Self-Care Home Management, Therapeutic Activities, Therapeutic Exercise, Home Exercise Program instruction, and Modalities to include: Electrical stimulation (unattended)    Education or treatment limitation: None  Rehab Potential:good    Oswestry Disability Index Score  Score: (Patient-Rptd)  16 % (11/19/2024  7:26 PM)      Patient/Family/Caregiver was advised of these findings, precautions, and treatment options and has agreed to actively participate in planning and for this course of care.    Thank you for your referral. Please co-sign or sign and return this letter via fax as soon as possible to [phone]. If you have any questions, please contact me at Dept: [phone]    Sincerely,  Electronically signed by therapist: Christy Persaud PT    Physician's certification required: Yes  I certify the need for these services furnished under this plan of treatment and while under my care.    X___________________________________________________ Date____________________    Certification From: 11/20/2024  To:2/18/2025

## 2024-11-21 ENCOUNTER — APPOINTMENT (OUTPATIENT)
Dept: PHYSICAL THERAPY | Age: 64
End: 2024-11-21
Attending: STUDENT IN AN ORGANIZED HEALTH CARE EDUCATION/TRAINING PROGRAM
Payer: COMMERCIAL

## 2024-11-22 ENCOUNTER — OFFICE VISIT (OUTPATIENT)
Dept: PHYSICAL THERAPY | Age: 64
End: 2024-11-22
Attending: STUDENT IN AN ORGANIZED HEALTH CARE EDUCATION/TRAINING PROGRAM
Payer: COMMERCIAL

## 2024-11-22 PROCEDURE — 97110 THERAPEUTIC EXERCISES: CPT

## 2024-11-22 PROCEDURE — 97530 THERAPEUTIC ACTIVITIES: CPT

## 2024-11-22 PROCEDURE — 97140 MANUAL THERAPY 1/> REGIONS: CPT

## 2024-11-22 NOTE — PROGRESS NOTES
Diagnosis:   Chronic bilateral low back pain with bilateral sciatica (M54.42,M54.41,G89.29)       Referring Provider: Jose Allen  Date of Evaluation:    11/20/2024    Precautions:  None Next MD visit:   none scheduled  Date of Surgery: n/a     Insurance Primary/Secondary: [employer] [place] / N/A     # Auth Visits: n/a            Subjective: Patient reports slight improvement in her radicular symptoms. Slight discomfort felt today morning .     Pain: 0/10      Objective: forward head , thoracic kyphosis, (R) shoulder elevated and turned anteriorly      Assessment: Min - mod discomfort in (R) iliac crest  and PSIS. Displays slight improvement in radicular symptoms. Pt displays muscle guarding in posterior chain. Pt  instructed on maintaining good posture and to relax . Focused on improving lumbo pelvic mobility . Initied bridges with variations.       Goals:     Pt will improve transversus abdominis recruitment to perform proper isometric contraction without requiring verbal or tactile cuing to promote advancement of therex   Pt will demonstrate good understanding of proper posture and body mechanics to decrease pain and improve spinal safety   Pt will achieve 5/5 MMT in core muscles to be able to stand up from squat position without any discomfort or radicular symptoms  Pt will report improved symptom centralization and absence of radicular symptoms for 3 consecutive days to improve function with ADL   Pt will have decreased paraspinal mm tension to tolerate standing >45 minutes for work and home activities   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  Pt will be independent and compliant with comprehensive HEP to maintain progress achieved in PT       Plan: Patient will be seen for 2 x/week or a total of 10 visits over a 90 day period. Treatment will include: Manual Therapy, Neuromuscular Re-education, Self-Care Home  Management, Therapeutic Activities, Therapeutic Exercise, Home Exercise Program instruction, and Modalities to include: Electrical stimulation (unattended)  Date: 11/22/2024  TX#: 2/10 Date:                 TX#: 3/ Date:                 TX#: 4/ Date:                 TX#: 5/ Date:   Tx#: 6/   TE: 25 minutes  Spine pelvic tilts: 20 x   Supine bridges: 20 x   Supine bridges on SB: 10 x   Supine SLR  on SB: 20 x   DKTC on SB: 20 x   Standing hip swing /hip hike : 20 x        MT: 10  SI joint mobs  Lumbar distraction       TA:10 minutes   Pt instructed on proper mechanics and ergonomics=cs with bed mobility , transfers and posture              HEP:  Lumbar extension based exercises to centralize symptoms    Charges: TE: 1; TA: 1; : MT : 1       Total Timed Treatment: 45 min  Total Treatment Time: 45 min          Objective findings during EvaL    Observation/Posture: forward head, protracted  shoulder s, mild thoracic kyphosis  Neuro Screen: numbness and burning sensation into (B) glutes and post thigh with position changes    Lumbar AROM: (* denotes performed with pain)  Flexion: WFL  Extension: WFL  Sidebending: R WFL; L WFL  Rotation: R WFL; L WFL    Accessory motion: favors (R) LE with standing   Palpation: denies any pain     Special tests:   Seated slump test : Negative  Supine SLR: Positive  (B)  SI joint dysfunction: Positive (R)     Oswestry Disability Index Score  Score: (Patient-Rptd) 16 % (11/19/2024  7:26 PM)        Certification From: 11/20/2024  To:2/18/2025

## 2024-11-25 ENCOUNTER — OFFICE VISIT (OUTPATIENT)
Dept: PHYSICAL THERAPY | Age: 64
End: 2024-11-25
Attending: STUDENT IN AN ORGANIZED HEALTH CARE EDUCATION/TRAINING PROGRAM
Payer: COMMERCIAL

## 2024-11-25 PROCEDURE — 97140 MANUAL THERAPY 1/> REGIONS: CPT

## 2024-11-25 PROCEDURE — 97110 THERAPEUTIC EXERCISES: CPT

## 2024-11-25 NOTE — PROGRESS NOTES
Diagnosis:   Chronic bilateral low back pain with bilateral sciatica (M54.42,M54.41,G89.29)       Referring Provider: Jose Allen  Date of Evaluation:    11/20/2024    Precautions:  None Next MD visit:   none scheduled  Date of Surgery: n/a     Insurance Primary/Secondary: [employer] [place] PPO / N/A     # Auth Visits: n/a            Subjective: Patient reports decreasing frequency and  intensity of her radicular symptoms. Achiness mostly localized to the spine now.      Pain: 0/10      Objective: forward head , thoracic kyphosis, (R) shoulder elevated and turned anteriorly      Assessment: Pt displays increased anxiety about her LBP pain and the imaging results. Imaging indicates degenerative type grade I -II anterolisthesis of L4 relative to L5. Pt apprehensive of any flexion,  extension  or twisting movements of spine. Guarded spinal movements  d/t fear of pain and worsening the ant slippage of vertebra. Focused on core stability  and spinal stabilisation exercises with neutral spine to reduce ow back pain in patient with spondylolisthesis. Reported minimal radicular symptoms and joint clicking in LB with some exercises, but able to resolve the symptoms with core activation. Constant cuing required to maintain good posture and to relax.       Goals:     Pt will improve transversus abdominis recruitment to perform proper isometric contraction without requiring verbal or tactile cuing to promote advancement of therex   Pt will demonstrate good understanding of proper posture and body mechanics to decrease pain and improve spinal safety   Pt will achieve 5/5 MMT in core muscles to be able to stand up from squat position without any discomfort or radicular symptoms  Pt will report improved symptom centralization and absence of radicular symptoms for 3 consecutive days to improve function with ADL   Pt will have decreased paraspinal mm tension to tolerate standing >45 minutes for work and home activities   Pt will  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  Pt will be independent and compliant with comprehensive HEP to maintain progress achieved in PT       Plan: Patient will be seen for 2 x/week or a total of 10 visits over a 90 day period. Treatment will include: Manual Therapy, Neuromuscular Re-education, Self-Care Home Management, Therapeutic Activities, Therapeutic Exercise, Home Exercise Program instruction, and Modalities to include: Electrical stimulation (unattended)  Date: 11/22/2024  TX#: 2/10 Date:  11/25/2024               TX#: 3/10 Date:                 TX#: 4/ Date:                 TX#: 5/ Date:   Tx#: 6/   TE: 25 minutes  Spine pelvic tilts: 20 x   Supine bridges: 20 x   Supine bridges on SB: 10 x   Supine SLR  on SB: 20 x   DKTC on SB: 20 x   Standing hip swing /hip hike : 20 x  TE: 37 minutes  Spine pelvic tilts: 20 x   Supine bridges: 20 x   Dead bug : 8 reps x 2  Supine bridges on SB: 10 x 2   Supine SLR  on SB: 20 x   DKTC on SB: 20 x   Standing hip swing /hip hike : 20 x   - walking out with resistance bands: SCR 10 x 1 each ; 5 sec hold      MT: 10  SI joint mobs  Lumbar distraction MT: 8 min  SI joint mobs  Lumbar distraction      TA:10 minutes   Pt instructed on proper mechanics and ergonomics=cs with bed mobility , transfers and posture              HEP:  Lumbar extension based exercises to centralize symptoms    Charges: TE: 2;  MT : 1       Total Timed Treatment: 45 min  Total Treatment Time: 45 min          Objective findings during EvaL    Observation/Posture: forward head, protracted  shoulder s, mild thoracic kyphosis  Neuro Screen: numbness and burning sensation into (B) glutes and post thigh with position changes    Lumbar AROM: (* denotes performed with pain)  Flexion: WFL  Extension: WFL  Sidebending: R WFL; L WFL  Rotation: R WFL; L WFL    Accessory motion: favors (R) LE with standing   Palpation: denies  any pain     Special tests:   Seated slump test : Negative  Supine SLR: Positive  (B)  SI joint dysfunction: Positive (R)     Oswestry Disability Index Score  Score: (Patient-Rptd) 16 % (11/19/2024  7:26 PM)        Certification From: 11/20/2024  To:2/18/2025

## 2024-11-26 ENCOUNTER — APPOINTMENT (OUTPATIENT)
Dept: PHYSICAL THERAPY | Age: 64
End: 2024-11-26
Attending: STUDENT IN AN ORGANIZED HEALTH CARE EDUCATION/TRAINING PROGRAM
Payer: COMMERCIAL

## 2024-11-30 ENCOUNTER — HOSPITAL ENCOUNTER (OUTPATIENT)
Dept: CT IMAGING | Facility: HOSPITAL | Age: 64
Discharge: HOME | End: 2024-11-30
Attending: STUDENT IN AN ORGANIZED HEALTH CARE EDUCATION/TRAINING PROGRAM
Payer: COMMERCIAL

## 2024-11-30 DIAGNOSIS — M47.816 ARTHRITIS OF LUMBAR SPINE: ICD-10-CM

## 2024-11-30 DIAGNOSIS — M54.16 LUMBAR RADICULOPATHY: ICD-10-CM

## 2024-11-30 DIAGNOSIS — M48.061 SPINAL STENOSIS OF LUMBAR REGION WITHOUT NEUROGENIC CLAUDICATION: ICD-10-CM

## 2024-11-30 DIAGNOSIS — M53.2X6 LUMBAR SPINE INSTABILITY: ICD-10-CM

## 2024-11-30 PROCEDURE — 72131 CT LUMBAR SPINE W/O DYE: CPT | Performed by: STUDENT IN AN ORGANIZED HEALTH CARE EDUCATION/TRAINING PROGRAM

## 2024-12-02 ENCOUNTER — OFFICE VISIT (OUTPATIENT)
Dept: PHYSICAL THERAPY | Age: 64
End: 2024-12-02
Attending: STUDENT IN AN ORGANIZED HEALTH CARE EDUCATION/TRAINING PROGRAM
Payer: COMMERCIAL

## 2024-12-02 DIAGNOSIS — M54.42 CHRONIC BILATERAL LOW BACK PAIN WITH BILATERAL SCIATICA: Primary | ICD-10-CM

## 2024-12-02 DIAGNOSIS — M54.41 CHRONIC BILATERAL LOW BACK PAIN WITH BILATERAL SCIATICA: Primary | ICD-10-CM

## 2024-12-02 DIAGNOSIS — G89.29 CHRONIC BILATERAL LOW BACK PAIN WITH BILATERAL SCIATICA: Primary | ICD-10-CM

## 2024-12-02 PROCEDURE — 97110 THERAPEUTIC EXERCISES: CPT

## 2024-12-02 NOTE — PROGRESS NOTES
Diagnosis:   Chronic bilateral low back pain with bilateral sciatica (M54.42,M54.41,G89.29)       Referring Provider: Jose Allen  Date of Evaluation:    11/20/2024    Precautions:  None Next MD visit:   none scheduled  Date of Surgery: n/a     Insurance Primary/Secondary: [employer] [place] P[place] / N/A     # Auth Visits: n/a            Subjective: Patient reports continued frequency and  intensity of her radicular symptoms. She has been doing her HEP and felt ok after her last visit.    Pain: 4-5/10      Objective: forward head , thoracic kyphosis, (R) shoulder elevated and turned anteriorly      Assessment: Pt presents with no increase in pain with extension based exercises on the table and in standing. Plan to continue these exercises.      Goals:     Pt will improve transversus abdominis recruitment to perform proper isometric contraction without requiring verbal or tactile cuing to promote advancement of therex   Pt will demonstrate good understanding of proper posture and body mechanics to decrease pain and improve spinal safety   Pt will achieve 5/5 MMT in core muscles to be able to stand up from squat position without any discomfort or radicular symptoms  Pt will report improved symptom centralization and absence of radicular symptoms for 3 consecutive days to improve function with ADL   Pt will have decreased paraspinal mm tension to tolerate standing >45 minutes for work and home activities   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  Pt will be independent and compliant with comprehensive HEP to maintain progress achieved in PT       Plan: Patient will be seen for 2 x/week or a total of 10 visits over a 90 day period. Treatment will include: Manual Therapy, Neuromuscular Re-education, Self-Care Home Management, Therapeutic Activities, Therapeutic Exercise, Home Exercise Program instruction, and Modalities to include:  Electrical stimulation (unattended)  Date: 11/22/2024  TX#: 2/10 Date:  11/25/2024               TX#: 3/10 Date:  12/2/24               TX#: 4/10 Date:                 TX#: 5/ Date:   Tx#: 6/   TE: 25 minutes  Spine pelvic tilts: 20 x   Supine bridges: 20 x   Supine bridges on SB: 10 x   Supine SLR  on SB: 20 x   DKTC on SB: 20 x   Standing hip swing /hip hike : 20 x  TE: 37 minutes  Spine pelvic tilts: 20 x   Supine bridges: 20 x   Dead bug : 8 reps x 2  Supine bridges on SB: 10 x 2   Supine SLR  on SB: 20 x   DKTC on SB: 20 x   Standing hip swing /hip hike : 20 x   - walking out with resistance bands: SCR 10 x 1 each ; 5 sec hold TE: 45 minutes  Supine bridges: 20 x   Supine SLR: 20 x ea.  Standing hip swing /hip hike : 20 x   TM 8' L1 1.8mph  Peewee. PFS and Glut. St.   Peewee. HS St. With pump 4x30\"  Prone Hip Ext. 20xea.  Prone Swim 20x  Prone on Elbows 20x     MT: 10  SI joint mobs  Lumbar distraction MT: 8 min  SI joint mobs  Lumbar distraction      TA:10 minutes   Pt instructed on proper mechanics and ergonomics=cs with bed mobility , transfers and posture       HEP:  Lumbar extension based exercises to centralize symptoms    Charges: TE: 3      Total Timed Treatment: 45 min  Total Treatment Time: 45 min          Objective findings during EvaL    Observation/Posture: forward head, protracted  shoulder s, mild thoracic kyphosis  Neuro Screen: numbness and burning sensation into (B) glutes and post thigh with position changes    Lumbar AROM: (* denotes performed with pain)  Flexion: WFL  Extension: WFL  Sidebending: R WFL; L WFL  Rotation: R WFL; L WFL    Accessory motion: favors (R) LE with standing   Palpation: denies any pain     Special tests:   Seated slump test : Negative  Supine SLR: Positive  (B)  SI joint dysfunction: Positive (R)     Oswestry Disability Index Score  Score: (Patient-Rptd) 16 % (11/19/2024  7:26 PM)        Certification From: 11/20/2024  To:2/18/2025

## 2024-12-05 ENCOUNTER — OFFICE VISIT (OUTPATIENT)
Dept: PHYSICAL THERAPY | Age: 64
End: 2024-12-05
Attending: STUDENT IN AN ORGANIZED HEALTH CARE EDUCATION/TRAINING PROGRAM
Payer: COMMERCIAL

## 2024-12-05 DIAGNOSIS — M54.42 CHRONIC BILATERAL LOW BACK PAIN WITH BILATERAL SCIATICA: Primary | ICD-10-CM

## 2024-12-05 DIAGNOSIS — G89.29 CHRONIC BILATERAL LOW BACK PAIN WITH BILATERAL SCIATICA: Primary | ICD-10-CM

## 2024-12-05 DIAGNOSIS — M54.41 CHRONIC BILATERAL LOW BACK PAIN WITH BILATERAL SCIATICA: Primary | ICD-10-CM

## 2024-12-05 PROCEDURE — 97110 THERAPEUTIC EXERCISES: CPT

## 2024-12-05 PROCEDURE — 97112 NEUROMUSCULAR REEDUCATION: CPT

## 2024-12-05 NOTE — PROGRESS NOTES
Diagnosis:   Chronic bilateral low back pain with bilateral sciatica       Referring Provider: Jose Allen  Date of Evaluation:    12/2/24    Precautions:  None Next MD visit:   none scheduled  Date of Surgery: n/a   Insurance Primary/Secondary: [employer] [place] P[place] / N/A     # Auth Visits: 10            Subjective: Patient reports lbp this afternoon. She has been doing her HEP and felt ok after her initial eval.    Pain: 3-4/10      Objective:   Posture- forward head , thoracic kyphosis, (R) shoulder elevated and turned anteriorly         Assessment: Patient presents with bilateral le tightness during her manual stretching. The patient tolerated her flexion based activities well. Added le and core exercises to her treatment.      Goals:   Pt will improve transversus abdominis recruitment to perform proper isometric contraction without requiring verbal or tactile cuing to promote advancement of therex   Pt will demonstrate good understanding of proper posture and body mechanics to decrease pain and improve spinal safety   Pt will achieve 5/5 MMT in core muscles to be able to stand up from squat position without any discomfort or radicular symptoms  Pt will report improved symptom centralization and absence of radicular symptoms for 3 consecutive days to improve function with ADL   Pt will have decreased paraspinal mm tension to tolerate standing >45 minutes for work and home activities   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  Pt will be independent and compliant with comprehensive HEP to maintain progress achieved in PT     Plan: Plan to work on stretching, strengthening and trom.  Date: 12/5/2024  TX#: 2/10 Date:                 TX#: 3/ Date:                 TX#: 4/ Date:                 TX#: 5/ Date:   Tx#: 6/   Ther. Ex.:30'  Nu-Step 8' L5  Peewee. PFS and Glut. St. 4x30\"  Peewee. HS St. With pump 4x30\"  Trunk Rolls 20xea.  PPT  20x3\"  SB LB St. 3x30\"       Neuro Re-ed.: 15'  Supine Marching 20xea.  S/L Abduction 20xea.  LAQs 20xea.       Modalities:  Ice 10' unbilled at her lb       HEP: Reviewed her original HEP.    Charges: 2TE and 1Neuro       Total Timed Treatment: 45 min  Total Treatment Time: 45 min

## 2024-12-09 ENCOUNTER — OFFICE VISIT (OUTPATIENT)
Dept: PHYSICAL THERAPY | Age: 64
End: 2024-12-09
Attending: STUDENT IN AN ORGANIZED HEALTH CARE EDUCATION/TRAINING PROGRAM
Payer: COMMERCIAL

## 2024-12-09 ENCOUNTER — TELEPHONE (OUTPATIENT)
Dept: PHYSICAL THERAPY | Age: 64
End: 2024-12-09

## 2024-12-09 PROCEDURE — 97110 THERAPEUTIC EXERCISES: CPT

## 2024-12-09 NOTE — PROGRESS NOTES
Diagnosis:   Chronic bilateral low back pain with bilateral sciatica (M54.42,M54.41,G89.29)       Referring Provider: Jose Allen  Date of Evaluation:    11/20/2024    Precautions:  None Next MD visit:   none scheduled  Date of Surgery: n/a     Insurance Primary/Secondary: [employer] [place] / N/A     # Auth Visits: n/a            Subjective: Patient reports decreasing frequency and  intensity of her radicular symptoms. Achiness mostly localized to the spine now.      Pain: 0/10      Objective: forward head , thoracic kyphosis, (R) shoulder elevated and turned anteriorly      Assessment: Pt displays progress in her Lumbar extension ROM without any exacerbation of symptoms. Progressed with extension based exercises to centralize her symptoms and improve core strength. Cuing provided to maintain shoulder symmetry and to prevent forward trunk leaning . All exercises performed to fatigue.    Goals:     Pt will improve transversus abdominis recruitment to perform proper isometric contraction without requiring verbal or tactile cuing to promote advancement of therex   Pt will demonstrate good understanding of proper posture and body mechanics to decrease pain and improve spinal safety   Pt will achieve 5/5 MMT in core muscles to be able to stand up from squat position without any discomfort or radicular symptoms  Pt will report improved symptom centralization and absence of radicular symptoms for 3 consecutive days to improve function with ADL   Pt will have decreased paraspinal mm tension to tolerate standing >45 minutes for work and home activities   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  Pt will be independent and compliant with comprehensive HEP to maintain progress achieved in PT       Plan: Patient will be seen for 2 x/week or a total of 10 visits over a 90 day period. Treatment will include: Manual Therapy, Neuromuscular  Re-education, Self-Care Home Management, Therapeutic Activities, Therapeutic Exercise, Home Exercise Program instruction, and Modalities to include: Electrical stimulation (unattended)  Date: 11/22/2024  TX#: 2/10 Date:  11/25/2024               TX#: 3/10 Date:  12/02/2024               TX#: 4/10 Date: 12/05/2024  Tx: 6/10 Date: 12/09/2024  Tx;7/10   TE: 25 minutes  Spine pelvic tilts: 20 x   Supine bridges: 20 x   Supine bridges on SB: 10 x   Supine SLR  on SB: 20 x   DKTC on SB: 20 x   Standing hip swing /hip hike : 20 x  TE: 37 minutes  Spine pelvic tilts: 20 x   Supine bridges: 20 x   Dead bug : 8 reps x 2  Supine bridges on SB: 10 x 2   Supine SLR  on SB: 20 x   DKTC on SB: 20 x   Standing hip swing /hip hike : 20 x   - walking out with resistance bands: SCR 10 x 1 each ; 5 sec hold TE: 45 minutes  Supine bridges: 20 x   Supine SLR: 20 x ea.  Standing hip swing /hip hike : 20 x   TM 8' L1 1.8mph  Peewee. PFS and Glut. St.   Peewee. HS St. With pump 4x30\"  Prone Hip Ext. 20xea.  Prone Swim 20x  Prone on Elbows 20x Ther. Ex.:30'  Nu-Step 8' L5  Peewee. PFS and Glut. St. 4x30\"  Peewee. HS St. With pump 4x30\"  Trunk Rolls 20xea.  PPT 20x3\"  SB LB St. 3x30\" TE: 45 minutes  - seated figure 4 stretch  - seated 3 way Ball roll out stretch  - 3 way stepping : GTB 2 laps each  -GARFIELD lumbar  extension  -Prone Hip Extension: 20  x 1 each  -prone alt UE/LE raises : 5 reps x 3   - bird/dog: 10 x 2 each  - child pose stretch: 3 x 1; 10 sec   -cat/cow stretches; on quadruped: 10 x 1  -Bridges on SB: 10 x 2   -s/l hip abd: 20 x 1    MT: 10  SI joint mobs  Lumbar distraction MT: 8 min  SI joint mobs  Lumbar distraction  Neuro Re-ed.: 15'  Supine Marching 20xea.  S/L Abduction 20xea.  LAQs 20xea.    TA:10 minutes   Pt instructed on proper mechanics and ergonomics=cs with bed mobility , transfers and posture   Modalities:  Ice 10' unbilled at her lb           HEP:  Lumbar extension based exercises to centralize symptoms    Charges: TE:3      Total Timed Treatment: 45 min  Total Treatment Time: 45 min          Objective findings during EvaL    Observation/Posture: forward head, protracted  shoulder s, mild thoracic kyphosis  Neuro Screen: numbness and burning sensation into (B) glutes and post thigh with position changes    Lumbar AROM: (* denotes performed with pain)  Flexion: WFL  Extension: WFL  Sidebending: R WFL; L WFL  Rotation: R WFL; L WFL    Accessory motion: favors (R) LE with standing   Palpation: denies any pain     Special tests:   Seated slump test : Negative  Supine SLR: Positive  (B)  SI joint dysfunction: Positive (R)     Oswestry Disability Index Score  Score: (Patient-Rptd) 16 % (11/19/2024  7:26 PM)        Certification From: 11/20/2024  To:2/18/2025

## 2024-12-11 NOTE — PROGRESS NOTES
[facility]  Neurological Surgery Established Patient Clinic Note    Alice Louis  3/28/1960  [id number]  PCP: Mackenzie Serna    REASON FOR VISIT:  Low back pain with radiation    HISTORY OF PRESENT ILLNESS 11/4/2024 (from Jose Allen PA-C):  Alice Louis is a(n) 64 year old female who presents today with concerns of back and leg pain.  The patient endorses approximately 6 months of symptoms without any known inciting events or traumas.  She describes intermittent bilateral low back pain with radiation into her bilateral buttocks, posterior thighs, and posterior aspect of the knee.  No associated numbness/tingling.  She rates the pain a 4/10 and states that it is intermittent in nature.  She noticed the pain after she had just finished physical therapy for tendinitis in her right hip, for which she was seeing an orthopedic provider.  She states that when she had the tendinitis, the pain was primarily in her right anterior hip.  She feels that this pain is different.  She feels that it is worse with particular movements such as bending and twisting, back extension, and transitioning from squatting to standing position.  Some activities do not cause any pain, such as cycling.  She sometimes feels better when she is moving around.  She has tried ibuprofen, ice, heat, and stretching without much relief.  She has never done PT for her back.  No spinal injections.  She no longer takes any pain medications.  No bowel/bladder incontinence or saddle anesthesia.     INTERVAL HISTORY 12/12/2024:  Ms. Louis returns today for follow-up approximately 6 weeks after her initial evaluation by Jose Allen PA-C. She reports ongoing axial low back pain radiating into the bilateral posterior thighs. Previously, she noted gradual improvement in pain severity with conservative measures and reduced high-impact activities; however, over the past 3 days, her pain has worsened  following a physical therapy session involving lumbar extension exercises. She denies new weakness, bowel or bladder incontinence, or saddle anesthesia. She continues to be active within her tolerance, avoids heavy lifting and high-impact exercises, and is able to perform modified resistance training without significant flare-ups. She is concerned that her back pain may limit her future activities but has not yet had to completely discontinue her routine. Since the last visit, no new diagnostic imaging has been performed, and she remains without medication changes.    PAST MEDICAL HISTORY:  Past Medical History:    PONV (postoperative nausea and vomiting)     PAST SURGICAL HISTORY:  Past Surgical History:   Procedure Laterality Date    D & c      Laparoscopy,diagnostic      Needle biopsy right      around 2010     FAMILY HISTORY:  family history includes Breast Cancer in her mother; Cancer (age of onset: 48) in her father; Cancer (age of onset: 65) in her mother; Cancer (age of onset: 80) in her paternal grandmother; Genetic Disease in her sister; Hypertension in her father and mother; Stroke in her mother.    SOCIAL HISTORY:   reports that she has quit smoking. Her smoking use included cigarettes. She has never used smokeless tobacco. She reports current alcohol use. She reports that she does not use drugs.    ALLERGIES:  Allergies[1]    MEDICATIONS:  Medications Ordered Prior to Encounter[2]    REVIEW OF SYSTEMS:  All other systems were reviewed and were negative except for those previously mentioned in the HPI    PHYSICAL EXAMINATION:  General: No acute distress.  Respiratory: Non-labored respirations bilaterally. No audible wheezing  Cardiovascular: Extremities warm and well-perfused.  Abdomen: Soft, nontender, nondistended.   Musculoskeletal: Moves all extremities well, symmetrically.  Extremities: No edema.    NEUROLOGIC EXAMINATION:  Mental status: Alert and oriented x 3  Speech: Clear, fluent  Cranial  nerves: PERRLA, EOMI, face symmetric, with normal strength and sensation, tongue and palate midline, SCM 5/5 bilaterally  Motor:     RIGHT  Delt 5/5   Bic 5/5  Tri 5/5   HI 5/5    5/5  IP 5/5   Quad 5/5   Ham 5/5   AT 5/5   EHL 5/5 Rola 5/5     LEFT    Delt 5/5   Bic 5/5  Tri 5/5   HI 5/5    5/5  IP 5/5   Quad 5/5   Ham 5/5   AT 5/5   EHL 5/5 Rola 5/5   No pronator drift  Tone: Normal  Atrophy/Fasciculations: None  Sensation: Normal to light touch, symmetric, no neglect  Cerebellar: Normal finger nose finger  Gait: Normal, nondistressed heel toe tandem gait      Reflexes: 2+ throughout, symmetric, no Anitha's    IMAGING:  XR lumbar flexion-extension 11/4/2024: Multilevel degenerative changes of the lumbar spine, particularly at L4-5 where there is a grade 1 spondylolisthesis present on axial loading with no significant change with dynamic views.    MRI lumbar 11/14/2024: Multilevel degenerative changes lumbar spine, most prominent at L4-5 where there is a grade 1 anterolisthesis leading to severe central, lateral recess, and left greater than right foraminal stenosis.  At L3-4 there is moderate left and mild right foraminal stenosis.   Of note, there is significant facet widening at L3-4 which is the levels.  But no spondylolisthesis at that level. Findings are consistent with spinal instability.     CT lumbar 11/30/2024: Appropriate bone density per Hounsfield units.  Adequate landing service for pedicle screw fixation.  No pars defects or fractures appreciated.      ASSESSMENT:  Ms. Louis’s imaging and clinical picture are consistent with a grade 1 degenerative spondylolisthesis at L4-5 with associated bilateral foraminal stenosis and likely dynamic instability that exacerbates symptoms, particularly with extension-based exercises. Although she has intermittent bilateral leg pain, no acute neurological deficits, red-flag symptoms, or severe progressive weakness are noted. She remains functional with  conservative care but experiences pain exacerbations when introducing lumbar extension movements. Overall, her condition suggests a structurally driven pain state due to instability at L4-5. While spinal fusion surgery may be indicated if symptoms worsen or limit her activity further, currently she remains stable enough to continue conservative management. The focus is on altering her exercise regimen to reduce provocative movements, strengthening her core and posterior chain through neutral spine techniques, and maintaining overall fitness and flexibility. She will monitor her symptoms for any progression that might warrant surgical intervention. Should her condition deteriorate or fail to improve, preoperative long-cassette scoliosis imaging and a detailed surgical plan considering spinal-pelvic parameters will be pursued. No immediate surgical intervention is indicated at this time given her stable function and manageable pain levels. The patient understands that surgery may become an option if her quality of life declines or conservative measures fail to maintain her current activity level.    Plan:  Physical Therapy Adjustment:  - Modify exercises to avoid repetitive lumbar extension and high-shear loading.  - Focus on core stabilization, neutral spine strengthening, and hip-hinge strategies rather than spine flexion/extension movements.    Lifestyle and Activity Modification:  - Encouraged continued low-impact aerobic exercise (e.g., cycling) and upper body strengthening without spinal loading.  - Maintain adequate protein intake and resistance training to preserve muscle mass and overall conditioning.    Imaging (If Symptoms Worsen):  - Obtain a standing scoliosis X-ray if considering surgical planning to assess spinal-pelvic alignment and guide potential fusion strategy.    Follow-Up:  - Routine follow-up in 3-6 months unless symptoms significantly worsen.  - Patient may contact the office sooner if new or  concerning symptoms arise (e.g., bowel/bladder dysfunction, progressive weakness, or severe unremitting pain).    Surjit Saravia MD  Neurological Surgery    [address], [address]  [place] [address]  [phone]  Pager [phone]  12/12/2024 11:33 AM      This note was created using a voice-recognition transcribing system. Incorrect words or phrases may have been missed during proofreading. Please interpret accordingly.    Total Time    Established Patient Total Time       100  minutes.      Activities       Preparing to see the patient (chart/tests/imaging review).       Obtaining and/or reviewing separately obtained history.       Performing a medically appropriate examination and/or evaluation.       Counseling and educating the patient/family/caregiver.       Ordering medications, tests, or procedures.       Referring and communicating with other health care professionals (when not separately reported).       Documenting clincal information in the electronic or other health record.       Independently interpreting results (not separately reported).    Communicating results to the patient/family/caregiver.    Care coordination (not separately reported).         [1] No Known Allergies  [2]   Current Outpatient Medications on File Prior to Visit   Medication Sig Dispense Refill    ibuprofen 800 MG Oral Tab Take 1 tablet (800 mg total) by mouth every 6 (six) hours as needed.      Meloxicam 7.5 MG Oral Tab Take 1 tablet every day by oral route as directed for 30 days.      amoxicillin clavulanate 875-125 MG Oral Tab Take 1 tablet by mouth 2 (two) times daily.      diazePAM 10 MG Oral Tab TAKE 1 TABLET BY MOUTH AT BEDTIME THE NIGHT BEFORE SURGERY      diazePAM 5 MG Oral Tab Take 1 tablet (5 mg total) by mouth as needed.      PARoxetine Mesylate 7.5 MG Oral Cap Take 1 capsule by mouth daily.      Triazolam 0.25 MG Oral Tab TAKE 1 TABLET BY MOUTH ONE HOUR  BEFORE APPOINTMENT      estradiol 0.025 MG/24HR Transdermal Patch Biweekly Place 1 patch onto the skin twice a week.      progesterone 100 MG Oral Cap       estradiol 0.1 MG/GM Vaginal Cream PLACE 1 GRAM VAGINALLY 2 DAYS A WEEK      erythromycin 5 MG/GM Ophthalmic Ointment APPLY 1 INCH IN BOTH EYES TWICE DAILY FOR 1 WEEK THEN AS NEEDED      Triazolam 0.125 MG Oral Tab Take one tablet 1 hour prior to MRI and a second tablet prior to MRI if needed. 2 tablet 0    progesterone 200 MG Oral Cap  (Patient not taking: Reported on 4/29/2024)      estradiol 0.5 MG Oral Tab  (Patient not taking: Reported on 4/29/2024)      Multiple Vitamins-Minerals (MULTIVITAMIN ADULTS 50+ OR) Take by mouth daily.      rosuvastatin 5 MG Oral Tab Take 1 tablet (5 mg total) by mouth nightly.      LORazepam 0.5 MG Oral Tab Take one 60 minutes prior to MRI and may have second dose if needed 30 minutes prior to MRI.  Will need a . (Patient not taking: Reported on 4/29/2024) 2 tablet 0     No current facility-administered medications on file prior to visit.

## 2024-12-12 ENCOUNTER — OFFICE VISIT (OUTPATIENT)
Dept: SURGERY | Facility: CLINIC | Age: 64
End: 2024-12-12
Payer: COMMERCIAL

## 2024-12-12 VITALS
WEIGHT: 150 LBS | HEIGHT: 66 IN | HEART RATE: 72 BPM | BODY MASS INDEX: 24.11 KG/M2 | OXYGEN SATURATION: 98 % | DIASTOLIC BLOOD PRESSURE: 78 MMHG | SYSTOLIC BLOOD PRESSURE: 122 MMHG

## 2024-12-12 DIAGNOSIS — M53.2X6 SPINAL INSTABILITY OF LUMBAR REGION: ICD-10-CM

## 2024-12-12 DIAGNOSIS — M43.16 SPONDYLOLISTHESIS AT L4-L5 LEVEL: Primary | ICD-10-CM

## 2024-12-12 DIAGNOSIS — M53.2X6 LUMBAR SPINE INSTABILITY: ICD-10-CM

## 2024-12-12 DIAGNOSIS — M51.362 DEGENERATION OF INTERVERTEBRAL DISC OF LUMBAR REGION WITH DISCOGENIC BACK PAIN AND LOWER EXTREMITY PAIN: ICD-10-CM

## 2024-12-12 DIAGNOSIS — G83.4 CAUDA EQUINA COMPRESSION (HCC): ICD-10-CM

## 2024-12-12 DIAGNOSIS — M48.062 LUMBAR STENOSIS WITH NEUROGENIC CLAUDICATION: ICD-10-CM

## 2024-12-12 DIAGNOSIS — M43.16 SPONDYLOLISTHESIS OF LUMBAR REGION: ICD-10-CM

## 2024-12-12 DIAGNOSIS — M48.061 STENOSIS OF LATERAL RECESS OF LUMBAR SPINE: ICD-10-CM

## 2024-12-12 DIAGNOSIS — M48.061 SPINAL STENOSIS OF LUMBAR REGION WITHOUT NEUROGENIC CLAUDICATION: ICD-10-CM

## 2024-12-12 DIAGNOSIS — M54.16 LUMBAR RADICULOPATHY: ICD-10-CM

## 2024-12-12 DIAGNOSIS — M47.816 LUMBAR SPONDYLOSIS: ICD-10-CM

## 2024-12-12 DIAGNOSIS — M48.061 LUMBAR FORAMINAL STENOSIS: ICD-10-CM

## 2024-12-12 NOTE — PROGRESS NOTES
Pt presents for F/U of back pain radiating down both legs past knees. Admits occasional N/T in arms. Denies weakness. Pt was improving but PT exercises have made pain worse for last 3 days. XR lumbar spine 11/4/24. MRI lumbar spine 11/14/24. CT lumbar spine 11/30/24.

## 2024-12-13 ENCOUNTER — APPOINTMENT (OUTPATIENT)
Dept: PHYSICAL THERAPY | Age: 64
End: 2024-12-13
Attending: STUDENT IN AN ORGANIZED HEALTH CARE EDUCATION/TRAINING PROGRAM
Payer: COMMERCIAL

## 2024-12-13 ENCOUNTER — TELEPHONE (OUTPATIENT)
Dept: PHYSICAL THERAPY | Facility: HOSPITAL | Age: 64
End: 2024-12-13

## 2024-12-14 ENCOUNTER — HOSPITAL ENCOUNTER (OUTPATIENT)
Dept: GENERAL RADIOLOGY | Facility: HOSPITAL | Age: 64
Discharge: HOME | End: 2024-12-14
Attending: STUDENT IN AN ORGANIZED HEALTH CARE EDUCATION/TRAINING PROGRAM
Payer: COMMERCIAL

## 2024-12-14 DIAGNOSIS — M53.2X6 LUMBAR SPINE INSTABILITY: ICD-10-CM

## 2024-12-14 DIAGNOSIS — M43.16 SPONDYLOLISTHESIS AT L4-L5 LEVEL: ICD-10-CM

## 2024-12-14 DIAGNOSIS — M54.16 LUMBAR RADICULOPATHY: ICD-10-CM

## 2024-12-14 DIAGNOSIS — M48.061 SPINAL STENOSIS OF LUMBAR REGION WITHOUT NEUROGENIC CLAUDICATION: ICD-10-CM

## 2024-12-14 PROCEDURE — 72082 X-RAY EXAM ENTIRE SPI 2/3 VW: CPT | Performed by: STUDENT IN AN ORGANIZED HEALTH CARE EDUCATION/TRAINING PROGRAM

## 2024-12-18 ENCOUNTER — TELEPHONE (OUTPATIENT)
Dept: PHYSICAL THERAPY | Facility: HOSPITAL | Age: 64
End: 2024-12-18

## 2024-12-18 ENCOUNTER — OFFICE VISIT (OUTPATIENT)
Dept: PHYSICAL THERAPY | Age: 64
End: 2024-12-18
Attending: STUDENT IN AN ORGANIZED HEALTH CARE EDUCATION/TRAINING PROGRAM
Payer: COMMERCIAL

## 2024-12-18 PROCEDURE — 97110 THERAPEUTIC EXERCISES: CPT

## 2024-12-18 NOTE — PROGRESS NOTES
Progress Summary  Pt has attended 8 visits in Physical Therapy.     Diagnosis:   Chronic bilateral low back pain with bilateral sciatica (M54.42,M54.41,G89.29)       Referring Provider: Jose Allen  Date of Evaluation:    11/20/2024    Precautions:  None Next MD visit:   none scheduled  Date of Surgery: n/a     Insurance Primary/Secondary: [employer] [place] PPO / N/A     # Auth Visits: n/a            Subjective: Patient reports being more comfortable with standing, walking and using stairs decreasing frequency and  intensity of her radicular symptoms. Achiness mostly localized to the spine now. Pt is anxious about slippage of vertebra and worsening her symptoms with her daily chores and lifting.     Pain: 0/10      Objective: forward head , thoracic kyphosis, (R) shoulder elevated and turned anteriorly      Assessment: Pt displays progress in her functional strength and mobility. Fluctuating tingling/burning sensation still in (B) hips radiating to thighs . Pt instructed to contact her doctor if symptoms worsens. She has met most of her goals. HEP, body mechanics and posture awareness  reviewed with her for continued wellness.  She wants to take a break from therapy and see if she can manage the symptoms with HEP  and planning to return to therapy if needed.     Goals:     Pt will improve transversus abdominis recruitment to perform proper isometric contraction without requiring verbal or tactile cuing to promote advancement of therex - Met  Pt will demonstrate good understanding of proper posture and body mechanics to decrease pain and improve spinal safety- Met   Pt will achieve 5/5 MMT in core muscles to be able to stand up from squat position without any discomfort or radicular symptoms- Progressing  Pt will report improved symptom centralization and absence of radicular symptoms for 3 consecutive days to improve function with ADL- partially met (fluctuating)   Pt will have decreased paraspinal mm tension to  tolerate standing >45 minutes for work and home activities-  Met  Pt will demonstrate improved posture 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 Treatment will include: Manual Therapy, Neuromuscular Re-education, Self-Care Home Management, Therapeutic Activities, Therapeutic Exercise, Home Exercise Program instruction, and Modalities to include: Electrical stimulation (unattended)       Patient/Family/Caregiver was advised of these findings, precautions, and treatment options and has agreed to actively participate in planning and for this course of care.    Thank you for your referral. If you have any questions, please contact me at Dept: [phone].    Sincerely,  Electronically signed by therapist: Christy Persaud, PT     Physician's certification required:  Yes  Please co-sign or sign and return this letter via fax as soon as possible to [phone].   I certify the need for these services furnished under this plan of treatment and while under my care.    X___________________________________________________ Date____________________    Certification From: 12/18/2024  To:3/18/2025                     Date: 11/22/2024  TX#: 2/10 Date:  11/25/2024               TX#: 3/10 Date:  12/02/2024               TX#: 4/10 Date: 12/05/2024  Tx: 6/10 Date: 12/09/2024  Tx;7/10 Date: 12/18/2024  Tx:8/10   TE: 25 minutes  Spine pelvic tilts: 20 x   Supine bridges: 20 x   Supine bridges on SB: 10 x   Supine SLR  on SB: 20 x   DKTC on SB: 20 x   Standing hip swing /hip hike : 20 x  TE: 37 minutes  Spine pelvic tilts: 20 x   Supine bridges: 20 x   Dead bug : 8 reps x 2  Supine bridges on SB: 10 x 2   Supine SLR  on SB: 20 x   DKTC on SB: 20 x    Standing hip swing /hip hike : 20 x   - walking out with resistance bands: SCR 10 x 1 each ; 5 sec hold TE: 45 minutes  Supine bridges: 20 x   Supine SLR: 20 x ea.  Standing hip swing /hip hike : 20 x   TM 8' L1 1.8mph  Peewee. PFS and Glut. St.   Peewee. HS St. With pump 4x30\"  Prone Hip Ext. 20xea.  Prone Swim 20x  Prone on Elbows 20x Ther. Ex.:30'  Nu-Step 8' L5  Peewee. PFS and Glut. St. 4x30\"  Peewee. HS St. With pump 4x30\"  Trunk Rolls 20xea.  PPT 20x3\"  SB LB St. 3x30\" TE: 45 minutes  - seated figure 4 stretch  - seated 3 way Ball roll out stretch  - 3 way stepping : GTB 2 laps each  -GARFIELD lumbar  extension  -Prone Hip Extension: 20  x 1 each  -prone alt UE/LE raises : 5 reps x 3   - bird/dog: 10 x 2 each  - child pose stretch: 3 x 1; 10 sec   -cat/cow stretches; on quadruped: 10 x 1  -Bridges on SB: 10 x 2   -s/l hip abd: 20 x 1  PN  TE: 45 minutes  - dead bugs: 10 x 1 with Band  - seated figure 4 stretch  - seated 3 way Ball roll out stretch  - 3 way stepping : GTB 2 laps each  - bird/dog: 10 x 2 each- without arching the spine   - child pose stretch: 3 x 1; 10 sec   Inclined push ups : 10 x 1  Push ups on knees : 5 x 2 with stable spine    -side lying  hip abd: 20 x 1   Prone planks: 10 -15 sec  x 2    MT: 10  SI joint mobs  Lumbar distraction MT: 8 min  SI joint mobs  Lumbar distraction  Neuro Re-ed.: 15'  Supine Marching 20xea.  S/L Abduction 20xea.  LAQs 20xea.     TA:10 minutes   Pt instructed on proper mechanics and ergonomics=cs with bed mobility , transfers and posture   Modalities:  Ice 10' unbilled at her lb             HEP:  Lumbar extension based exercises to centralize symptoms    Charges: TE:3     Total Timed Treatment: 45 min  Total Treatment Time: 45 min          Objective findings during EvaL    Observation/Posture: forward head, protracted  shoulder s, mild thoracic kyphosis  Neuro Screen: numbness and burning sensation into (B) glutes and post thigh with position changes    Lumbar AROM: (* denotes  performed with pain)  Flexion: WFL  Extension: WFL  Sidebending: R WFL; L WFL  Rotation: R WFL; L WFL    Accessory motion: favors (R) LE with standing   Palpation: denies any pain     Special tests:   Seated slump test : Negative  Supine SLR: Positive  (B)  SI joint dysfunction: Positive (R)     Oswestry Disability Index Score  Score: (Patient-Rptd) 16 % (11/19/2024  7:26 PM)        Certification From: 11/20/2024  To:2/18/2025

## 2025-01-03 ENCOUNTER — APPOINTMENT (OUTPATIENT)
Dept: PHYSICAL THERAPY | Age: 65
End: 2025-01-03

## 2025-01-06 ENCOUNTER — APPOINTMENT (OUTPATIENT)
Dept: PHYSICAL THERAPY | Age: 65
End: 2025-01-06

## 2025-01-09 ENCOUNTER — APPOINTMENT (OUTPATIENT)
Dept: PHYSICAL THERAPY | Age: 65
End: 2025-01-09

## 2025-01-14 ENCOUNTER — HOSPITAL ENCOUNTER (OUTPATIENT)
Dept: MRI IMAGING | Facility: HOSPITAL | Age: 65
Discharge: HOME | End: 2025-01-14
Attending: INTERNAL MEDICINE
Payer: COMMERCIAL

## 2025-01-14 ENCOUNTER — TELEPHONE (OUTPATIENT)
Age: 65
End: 2025-01-14

## 2025-01-14 DIAGNOSIS — R92.8 ABNORMAL MRI, BREAST: Primary | ICD-10-CM

## 2025-01-14 DIAGNOSIS — Z12.39 BREAST CANCER SCREENING, HIGH RISK PATIENT: ICD-10-CM

## 2025-01-14 DIAGNOSIS — Z84.81 FAMILY HISTORY OF GENETIC DISEASE CARRIER: ICD-10-CM

## 2025-01-14 DIAGNOSIS — Z12.39 BREAST CANCER SCREENING OTHER THAN MAMMOGRAM: ICD-10-CM

## 2025-01-14 DIAGNOSIS — Z80.3 FAMILY HISTORY OF BREAST CANCER IN FIRST DEGREE RELATIVE: ICD-10-CM

## 2025-01-14 PROCEDURE — 77049 MRI BREAST C-+ W/CAD BI: CPT | Performed by: INTERNAL MEDICINE

## 2025-01-14 PROCEDURE — A9575 INJ GADOTERATE MEGLUMI 0.1ML: HCPCS | Performed by: INTERNAL MEDICINE

## 2025-01-14 RX ORDER — GADOTERATE MEGLUMINE 376.9 MG/ML
15 INJECTION INTRAVENOUS
Status: COMPLETED | OUTPATIENT
Start: 2025-01-14 | End: 2025-01-14

## 2025-01-14 RX ADMIN — GADOTERATE MEGLUMINE 14 ML: 376.9 INJECTION INTRAVENOUS at 10:57:00

## 2025-01-14 NOTE — TELEPHONE ENCOUNTER
Phoned patient to review breast MRI findings:     FINDINGS:   RIGHT BREAST:    There is a focus of susceptibility artifact in the outer right breast that corresponds to an X shaped core biopsy clip visible on the prior mammogram.      There is non-mass enhancement in the upper outer right breast anterior to mid depth that demonstrates a segmental distribution.  The most confluent region measures approximately 4.5 x 2.6 cm in greatest axial dimensions (series 89284, image 106; location    -34.1 mm).  However, the posterior margin of this non-mass enhancement appears to splay in craniocaudad dimension measuring approximately 4.8 cm where there is a more ill-defined patchy area of non-mass enhancement superiorly (series 17, image 250).    This enhancement measures approximately 1.9 cm from the nipple/areolar complex, 1.6 cm from the lateral skin surface and 3.6 cm from the chest wall.      No abnormal right axillary or right internal mammary lymph nodes are seen.      LEFT BREAST:    No abnormal enhancement is seen.       No abnormal left axillary or left internal mammary lymph nodes are seen.      EXTRAMAMMARY FINDINGS: Unremarkable    Introduced myself as Breast Nurse Navigator.  The above results reviewed with patient.  Shared with patient recommendation from Radiologist to proceed with a 2 site MRI guided breast biopsy.  Shared with patient she will be contacted by Breast Care Coordinator to discuss biopsy and scheduling.  Encouraged patient to contact Dr. Roberson's office with questions or concerns.

## 2025-01-14 NOTE — IMAGING NOTE
I called pt Discussed recommended breast biopsy with patient.  Patient is being recommended for MRI biopsy of the right  Breast x 2   by Dr. Martin. Pt has 1 son 20 dtr 18 .  Hx In epic meds takes ibuprofen and meloxicam  denies usage . Took valium for mri. Pt informed if needed to take valium for mri Bx pt will need  and arrive earlier  at 1015 am to sign consent prior to taking valium.  Pt verbalizes understanding  and agreement. Pt verbalizes having  drop her off and come back to get her after Bx done. Supportive care given.Pt was provided Wed 1/22/25 checking in at 1030 if not taking valium Dx Main.  Hx took two ibuprofen Sunday 1/12/25 pt will stay off  Pt history discussed as below:  Pt history of biopsy: yes Rt benign    Family history of cancer: yes mom breast ca dx age 65  sister has  check 2 gene mutation. Pt has not had  genetic testing done .   Pt history of breast cancer: yes   Hx BCP use:      yes 6 years               HRT use: yes  currently 1-2 years  ivf  yes 5-6 rounds   RECOMMENDATIONS:   MRI-GUIDED BREAST BIOPSY: RIGHT BREAST  x 2       Recommedations :                      see [facility] for dictated radiology report   Reviewed pertinent patient history, family history of cancer, and patient medications  Discussed with patient Radiology’s protocol regarding medications, as well as over-the-counter vitamin or herbal supplements, as follows:  -All herbal supplements, Vitamin E, Fish Oil    -All NSAIDs (Ibuprofen, Motrin, Advil, Aleve, or other antiinflammatory medication)  and Aspirin  81mg currently being taken    not  recommended or prescribed by  your physician  should be held for 5  days prior to biopsy.  Ibuprofen 2 tabs -Aspirin 81 mg being taken related to a cardiac condition  or prescribed by your  physician should be held at the  direction of your physician.  Informed patient to call ordering physician for denies usage     -Blood thinners/antiplatelet medications (Coumadin,  Plavix  ect) should be held at the  direction of your physician.  Informed patient to call ordering physician for guidelines denies usage     Reviewed MRI (MAGNETIC RESONANCE IMAGING) biopsy procedure, as below.  For this procedure, you will be lying on your stomach with your breast in compression .    AN IV WILL BE STARTED WHEN YOU ARRIVE AND YOUR KIDNEY FUNCTION WILL BE CHECK IF NOT DONE PREVIOUSLY. ENCOURAGED PATIENT TO KEEP WELL HYDRATED 2-3 DAYS BEFORE THE BIOPSY TO AID WITH  PROMOTING ADEQUATE KIDNEY FUNCTION WHILE RECEIVING CONTRAST. You will receive contrast to assist locating the area to be biopsied.  Mri  imaging will be   reviewed and compared to previous mri.   Once site is confirmed the Radiologist will then inject a local numbing medication into the area and then use a needle to collect cells from the site.  A marker, or clip, will then be placed in the biopsied area.  This marker is placed so this biopsy site is able to be accurately located upon future breast imaging.  After the clip is placed, the RN will place a dressing on the biopsy site.  Additional mammography films will then be taken to assure correct placement of the marker.    Educated the patient they will be awake during this procedure and are able to drive themselves home if they wish.    Educated patient that some soreness may occur after biopsy.  Discussed use of a supportive bra and ice packs after procedure, to decrease soreness.    Discussed with patient no swimming, bathing,  hot tubs , or submerging underwater for 5 days and   until the incision is closed and healed.  Educated patient on lifting restrictions - nothing heavier than a gallon of milk for 24-48 hours after the procedure.      Discussed with patient that some soreness and bruising is normal after biopsy but that prolonged or increased pain and bruising should be reported to the ordering physician.  An ace wrap will be applied over bra for added support and should be  left on for 24 hours post procedure.  Reviewed results process with patient and shared that pathology results will be available within 2-3 business days of their biopsy.  Discussed results will be communicated by their ordering physician unless otherwise indicated.  Educated patient that once we receive an order from their physician our radiology secretaries will be calling them to schedule their biopsy. iscussed recommended breast biopsy with patient.

## 2025-01-16 ENCOUNTER — TELEPHONE (OUTPATIENT)
Age: 65
End: 2025-01-16

## 2025-01-16 NOTE — TELEPHONE ENCOUNTER
Call received from patient with questions concerning MRI breast biopsy recommendation.  Patient with questions regarding need to proceed with biopsy.  MRI report reviewed with patient.  Patient's questions persist.  Patient inquires as to ability to speak with Radiologist regarding the report and biopsy recommendation.  Shared with patient I will reach out to Radiology and make this inquiry on her behalf.

## 2025-01-23 NOTE — DISCHARGE INSTRUCTIONS
The Doctor (Radiologist) who performed your procedure was:          Place an ice pack over the biopsy site on top of your bra or on top of the ACE wrap (never apply ice directly over skin) for 10-15 minutes of every hour until bedtime for your comfort and to decrease bleeding.  Keep your sports bra or the ACE wrap (stereotactic and MRI biopsy) in place for 24 hours after your biopsy. This compression decreases bleeding and breast movement for your comfort. Wear a supportive bra for the next couple of days for comfort (sports bra for sleep).   Continue to wear, preferably, a sports bra or good supportive bra for 1 week and take off only to shower.  No baths or showers during the first 24 hours after biopsy. After this time you may take a shower. It's okay if the strips get wet but do not soak them. NO saunas, hot tubs or swimming until steri-strips fall off (approx. 5 days). This prevents infection and allows time for them to completely close and heal.  DO NOT remove the steri-strips. They will fall off in 5 days. If any type of irritation (redness, itching or blisters) develops in the area around the steri-strips, remove them gently. If the steri-strips do not fall off after 5 days, gently remove them. Keep the area clean and dry.  It is normal to have mild discomfort and bruising at the biopsy site.  You may take Tylenol as needed for discomfort, as long as you have no allergies to Tylenol. Do not take aspirin, motrin, ibuprofen or any medication containing NSAID (non-steroidal anti-inflammatory drug) product for 48 hours.  DO NOT participate in strenuous activity (aerobics, heavy lifting, housework, gardening, etc.) 48 hours after your biopsy to prevent bleeding.  You will receive results in 2-3 business days.  If you are having an MRI breast biopsy or an Ultrasound guided breast biopsy, you will be billed for the biopsy and unilateral mammogram separately.  If you have any questions about the procedure or your  results, please contact the Breast Care Coordinator Nurse at [phone].  Notify your ordering physician or primary physician for increased bleeding, pain or fever over 100. Or contact a Radiology Nurse at [phone] between 8am-4pm (after 4pm, your call will be directed to the [facility]).

## 2025-01-24 ENCOUNTER — HOSPITAL ENCOUNTER (OUTPATIENT)
Dept: MRI IMAGING | Facility: HOSPITAL | Age: 65
Discharge: HOME | End: 2025-01-24
Attending: INTERNAL MEDICINE
Payer: COMMERCIAL

## 2025-01-24 ENCOUNTER — HOSPITAL ENCOUNTER (OUTPATIENT)
Dept: MAMMOGRAPHY | Facility: HOSPITAL | Age: 65
Discharge: HOME | End: 2025-01-24
Attending: INTERNAL MEDICINE
Payer: COMMERCIAL

## 2025-01-24 DIAGNOSIS — R92.8 ABNORMAL MRI, BREAST: ICD-10-CM

## 2025-01-24 DIAGNOSIS — Z84.81 FAMILY HISTORY OF GENETIC DISEASE CARRIER: ICD-10-CM

## 2025-01-24 DIAGNOSIS — Z80.3 FAMILY HISTORY OF BREAST CANCER IN FIRST DEGREE RELATIVE: ICD-10-CM

## 2025-01-24 PROCEDURE — 88305 TISSUE EXAM BY PATHOLOGIST: CPT | Performed by: INTERNAL MEDICINE

## 2025-01-24 PROCEDURE — 77065 DX MAMMO INCL CAD UNI: CPT | Performed by: INTERNAL MEDICINE

## 2025-01-24 PROCEDURE — A9575 INJ GADOTERATE MEGLUMI 0.1ML: HCPCS | Performed by: INTERNAL MEDICINE

## 2025-01-24 PROCEDURE — 88342 IMHCHEM/IMCYTCHM 1ST ANTB: CPT | Performed by: INTERNAL MEDICINE

## 2025-01-24 PROCEDURE — 19085 BX BREAST 1ST LESION MR IMAG: CPT | Performed by: INTERNAL MEDICINE

## 2025-01-24 PROCEDURE — 19086 BX BREAST ADD LESION MR IMAG: CPT | Performed by: INTERNAL MEDICINE

## 2025-01-24 PROCEDURE — 88341 IMHCHEM/IMCYTCHM EA ADD ANTB: CPT | Performed by: INTERNAL MEDICINE

## 2025-01-24 RX ORDER — GADOTERATE MEGLUMINE 376.9 MG/ML
15 INJECTION INTRAVENOUS
Status: COMPLETED | OUTPATIENT
Start: 2025-01-24 | End: 2025-01-24

## 2025-01-24 RX ADMIN — GADOTERATE MEGLUMINE 14 ML: 376.9 INJECTION INTRAVENOUS at 12:20:00

## 2025-01-24 NOTE — IMAGING NOTE
1103 To Radiology holding area, hx as copied from 1/14 MRI:CONCLUSION:   Suspicious non mass enhancement in the upper outer right breast anterior to middle depth.  Recommend further evaluation with a 2 site MR guided right breast biopsy targeting the anterior and posterior margins. Pt took Ativan 0.5 mg once here, she has a .      1115 IV 22 gauge started by MATT Mims RN    1119 Consent verified and obtained. Two sites to be biopsied.     Post instructions given verbal et written. Pt verbalizes understanding and agreement. MRI dept not yet ready, pt informed.    1150 Pt to MRI dept     1155 Dr DE LA PAZ here, procedure explained, questions answered .     1155 Time out taken    1155 Patient to MRI table,  scans taken at 1203.    SITE # 1: RIGHT BREAST, POSTERIOR UPPER OUTER    1214 Betadine as skin prep.    1215  Lidocaine 1% 10 milligrams per ml 5 ml given.    1215 Lidocaine 1% with epinephrine 1:100,000 units 200 milligrams per 20 ml  Total amount given 15 ml.    1218 9 Gauge needle placed     1225 Sampling begins     1226 Sampling completed.    1227 HOURGLASS breast clip placed .    1227 Formalin added to samples    SITE # 2:RIGHT BREAST, ANTERIOR UPPER OUTER    1219 Betadine as skin prep.    1220 Lidocaine 1% 10 milligrams per ml 5 ml given.    1220 Lidocaine 1% with epinephrine 1:100,000 units 200 milligrams per 20 ml.  Total amount given 15 ml.    1221 9 Gauge needle placed     1229 Sampling begins     1230 Sampling completed.    1230 STOPLIGHT breast clip placed .    1230 Formalin added to samples    1233  Procedure complete, pressure to sites for 10 minutes each site.   Area  cleaned, steri-strips to sites.  Cold pack to sites.  IV removed by MRI staff.      1245 To mammography department for post clip images.      After mammogram completed, bra will be applied by patient. A 6 \" ace wrap secured over bra by [employer]. Pt will be discharged by mammo staff.       Cores taken to pathology by CLEMENT Paez  HERBER.

## 2025-01-24 NOTE — PROCEDURES
[facility]  part of [facility]  Procedure Note    Alicenava Louis Patient Status:  Outpatient    3/28/1960 MRN [id number]   Location [facility] MRI Attending Ramona Roberson MD   Hosp Day # 0 PCP Mackenzie Serna     Procedure: MRI guided biopsy, right breast, x2 sites    Pre-Procedure Diagnosis:  suspicious non-mass enhancement of the right breast    Post-Procedure Diagnosis: same    Anesthesia:  Local    Findings:  suspicious non-mass enhancement of the right breast    Specimens: multiple core biopsies, anterior breast; multiple core biopsies, posterior breast    Blood Loss:  0    Tourniquet Time: 0  Complications:  None  Drains:  0    Secondary Diagnosis:  none    Miguel Angel Kelley MD  2025

## 2025-01-27 ENCOUNTER — TELEPHONE (OUTPATIENT)
Age: 65
End: 2025-01-27

## 2025-01-27 NOTE — TELEPHONE ENCOUNTER
Patient is s/p MRI breast biopsy right, performed on 1/24/25 at [facility].  Pathology results are as follows:    Final Diagnosis:     A. Right breast, upper outer posterior; MRI-guided core biopsy:  Benign breast tissue with focal usual ductal hyperplasia.  Microcalcifications present.  No glandular atypia or malignancy identified.     B. Right breast, upper outer anterior; MRI-guided core biopsy:  Benign breast tissue with focal atypical lobular hyperplasia (2 mm) and focal usual ductal hyperplasia.  Microcalcifications present.  No malignancy identified (see comment).     Recommendations from Radiologist are as follows:  CONCLUSION: Uneventful MRI-guided breast biopsy.         RECOMMENDATION:       Anterior right breast:  Pathology is benign and concordant.  Atypical lobular hyperplasia is present; surgical consultation is recommended for risk assessment and evaluation for possible excision.       Posterior right breast:  Pathology is benign and concordant.     The above results were reviewed with the patient.  Educated patient as to the management of ALH.  Patient has been referred to Dr. Downs for consultation and discussion of surgical excision.  Shared with patient she will receive a call from Dr. Downs's office for appointment scheduling.  Patient acknowledged.

## 2025-02-19 ENCOUNTER — OFFICE VISIT (OUTPATIENT)
Facility: CLINIC | Age: 65
End: 2025-02-19
Payer: COMMERCIAL

## 2025-02-19 VITALS
TEMPERATURE: 97 F | SYSTOLIC BLOOD PRESSURE: 128 MMHG | HEART RATE: 67 BPM | BODY MASS INDEX: 25.66 KG/M2 | HEIGHT: 66 IN | DIASTOLIC BLOOD PRESSURE: 83 MMHG | OXYGEN SATURATION: 98 % | RESPIRATION RATE: 18 BRPM | WEIGHT: 159.63 LBS

## 2025-02-19 DIAGNOSIS — N60.91 ATYPICAL LOBULAR HYPERPLASIA (ALH) OF RIGHT BREAST: ICD-10-CM

## 2025-02-19 DIAGNOSIS — Z80.3 FAMILY HISTORY OF BREAST CANCER IN FIRST DEGREE RELATIVE: Primary | ICD-10-CM

## 2025-02-19 NOTE — PROGRESS NOTES
Breast Surgery New Patient Consultation    This is the first visit for this 64 year old woman, referred by Dr. Serna, who presents for evaluation of abnormal right breast imaging.    History of Present Illness:   Ms. Alice Louis is a 64 year old woman who presents with imaging detected concern to the right breast.  The patient denies any palpable masses, nipple discharge, skin changes or axillary symptoms.  She does have a family history of breast cancer.  She has no personal prior history of breast disease or biopsies.  She had her screening mammogram on May 9, 2024 was found to have no suspicious findings.  Given her family history of breast cancer she was referred for an MRI in 2025 and was noted to have a 4.5 x 2.6 cm area of non-mass enhancement with no concerns seen in the left breast.  She had a 2 site biopsy of the right breast on 2025 and was found to have benign tissue in the upper outer posterior location and focal ALH measuring only 2 mm at the upper outer anterior location. She is here today for evaluation and recommendations for further therapy.        Past Medical History:    Family history of high cholesterol    PONV (postoperative nausea and vomiting)       Past Surgical History:   Procedure Laterality Date    D & c      D&c after delivery N/A     Foot surgery N/A     Laparoscopy,diagnostic      Needle biopsy right      around        Gynecological History:  Pt is a   She achieved menarche at age 17 and LMP   Age of Menopause: 59  Type: natural menopause  She has history of hormone replacement therapy for 2 1/2  years, currently taking .  She has history of oral contraceptive use for 5 years, last in .  She has recieved infertility treatment to achieve pregnancy.    Medications:    No outpatient medications have been marked as taking for the 25 encounter (Office Visit) with Rossana Downs MD.       Allergies:    Allergies[1]    Family  History:   Family History   Problem Relation Age of Onset    Cancer Father 48        metastatic neuroendocrine tumor    Hypertension Father     Stroke Mother     Cancer Mother 65        breast    Hypertension Mother     Breast Cancer Mother         65    Cancer Paternal Grandmother 80        ?CRC-had colostomy    Genetic Disease Sister         CHEK2 low penetrance mutation ([employer])       She is not of Ashkenazi Worship ancestry.    Social History:  History   Alcohol Use    Yes     Comment: rarely       History   Smoking Status    Former    Types: Cigarettes   Smokeless Tobacco    Never     Ms. Alice Louis is  with 0 children. She has 4 siblings. She is currently Retired    Review of Systems:  General:   The patient denies, fever, chills, night sweats, fatigue, generalized weakness, change in appetite or weight loss.    HEENT:     The patient denies eye irritation, cataracts, redness, glaucoma, yellowing of the eyes, change in vision, color blindness, or +wearing contacts/glasses. The patient denies hearing loss, ringing in the ears, ear drainage, earaches, nasal congestion, nose bleeds, snoring, pain in mouth/throat, hoarseness, change in voice, facial trauma.    Respiratory:  The patient denies chronic cough, phlegm, hemoptysis, pleurisy/chest pain, pneumonia, asthma, wheezing, difficulty in breathing with exertion, emphysema, chronic bronchitis, shortness of breath or abnormal sound when breathing.     Cardiovascular:  There is no history of chest pain, chest pressure/discomfort, palpitations, irregular heartbeat, fainting or near-fainting, difficulty breathing when lying flat, SOB/Coughing at night, swelling of the legs or chest pain while walking.    Breasts:  See history of present illness    Gastrointestinal:     There is no history of difficulty or pain with swallowing, reflux symptoms, vomiting, dark or bloody stools, constipation, yellowing of the skin, indigestion, nausea, change in bowel  habits, diarrhea, abdominal pain or vomiting blood.     Genitourinary:  The patient denies frequent urination, needing to get up at night to urinate, urinary hesitancy or retaining urine, painful urination, urinary incontinence, decreased urine stream, blood in the urine or vaginal/penile discharge.    Skin:    The patient denies rash, itching, skin lesions, dry skin, change in skin color or change in moles.     Hematologic/Lymphatic:  The patient denies easily bruising or bleeding or persistent swollen glands or lymph nodes.     Musculoskeletal:  The patient denies muscle aches/pain, joint pain, stiff joints, neck pain, back pain or bone pain.    Neuropsychiatric:  There is no history of migraines or severe headaches, seizure/epilepsy, speech problems, coordination problems, trembling/tremors, fainting/black outs, dizziness, memory problems, loss of sensation/numbness, problems walking, weakness, tingling or burning in hands/feet. There is no history of abusive relationship, bipolar disorder, sleep disturbance, anxiety, depression or feeling of despair.    Endocrine:    There is no history of poor/slow wound healing, weight loss/gain, +fertility or hormone problems, cold intolerance, thyroid disease.     Allergic/Immunologic:  There is no history of hives, hay fever, angioedema or anaphylaxis.    /83 (BP Location: Left arm, Patient Position: Sitting, Cuff Size: adult)   Pulse 67   Temp 97.2 °F (36.2 °C) (Temporal)   Resp 18   Ht 1.676 m (5' 6\")   Wt 72.4 kg (159 lb 9.6 oz)   SpO2 98%   BMI 25.76 kg/m²     Physical Exam:  The patient is an alert, oriented, well-nourished and  well-developed woman who appears her stated age. Her speech patterns and movements are normal. Her affect is appropriate.    HEENT: The head is normocephalic. The neck is supple. The thyroid is not enlarged and is without palpable masses/nodules. There are no palpable masses. The trachea is in the midline. Conjunctiva are clear,  non-icteric.    Chest: The chest expands symmetrically. The lungs are clear to auscultation.    Heart: The rhythm is regular.  There are no murmurs, rubs, gallops or thrills.    Breasts:  Her breasts are symmetrical with a cup size C.  Right breast: The skin, nipple ,and areola appear normal. There is no skin dimpling with movement of the pectoralis. There is no nipple retraction. No nipple discharge can be elicited. The parenchyma is mildly nodular. There are no dominant masses in the breast. The axillary tail is normal.  Left breast:   The skin, nipple, and areola appear normal. There is no skin dimpling with movement of the pectoralis. There is no nipple retraction. No nipple discharge can be elicited. The parenchyma is mildly nodular. There are no dominant masses in the breast. The axillary tail is normal.    Abdomen:  The abdomen is soft, flat and non tender. The liver is not enlarged. There are no palpable masses.    Lymph Nodes:  The supraclavicular, axillary and cervical regions are free of significant lymphadenopathy.    Back: There is no vertebral column tenderness.    Skin: The skin appears normal. There are no suspicious appearing rashes or lesions.    Extremities: The extremities are without deformity, cyanosis or edema.    Impression:   Ms. Alice Louis is a 64 year old woman presents with a history of breast cancer and right breast atypical lobular hyperplasia.    Discussion and Plan:  I had a discussion with the Patient regarding her breast exam. On exam today I found to be healing well since the biopsy and no other clinical findings bilaterally.  I personally reviewed the recent imaging and pathology and we discussed this at length.    The significance and implications of ALH found on core biopsy with regard to future breast cancer risk was discussed with the patient. I explained that surigical excision of the area in the setting of ALH is not routinely advocated as this is thought to be a  marker of increased risk rather than a true precursor lesion. I discussed that her risk of invasive disease is conferred bilaterally and that subsequent cancers can be of both the ductal and lobular phenotype. Options with regard to management can include: (1) lifelong surveillance with the goal of detecting subsequent malignancy at an early stage; with or without (2) chemoprevention.    Discussed a possible sampling error given the large area of enhancement.  We offered her a right breast wire localized excisional biopsy.  She reports she is not interested in proceeding with any surgery at this time.  We alternatively discussed the role of chemoprevention in this setting and the role of risk reducing endocrine therapy.  We discussed if she proceeds with no intervention at all that we repeat the MRI July 2025 to ensure there is no progression of the concern.  Will delay in diagnosis was discussed with the patient at length and she will contact my office with her decision.  The risks and possible complications of the procedure were explained to the patient and her family and she understood and agreed to the proposed plan. She was given ample opportunity for questions and those questions were answered to her satisfaction. She has been  encouraged to contact the office with any questions or concerns prior to her next appointment.     This note was created by Dragon voice recognition. Errors in content may be related to improper recognition by the system; efforts to review and correct have been done but errors may still exist. Please be advised the primary purpose of this note is for me to communicate medical care. Standard sentence structure is not always used. Medical terminology and medical abbreviations may be used. There may be grammatical, typographical, and automated fill ins that may have errors missed in proofreading.             [1] No Known Allergies

## 2025-02-23 ENCOUNTER — PATIENT MESSAGE (OUTPATIENT)
Dept: SURGERY | Facility: CLINIC | Age: 65
End: 2025-02-23

## 2025-02-23 DIAGNOSIS — M43.16 SPONDYLOLISTHESIS OF LUMBAR REGION: ICD-10-CM

## 2025-02-23 DIAGNOSIS — M25.559 HIP PAIN, UNSPECIFIED LATERALITY: ICD-10-CM

## 2025-02-23 DIAGNOSIS — M54.16 LUMBAR RADICULOPATHY: Primary | ICD-10-CM

## 2025-02-23 DIAGNOSIS — M51.362 DEGENERATION OF INTERVERTEBRAL DISC OF LUMBAR REGION WITH DISCOGENIC BACK PAIN AND LOWER EXTREMITY PAIN: ICD-10-CM

## 2025-02-23 DIAGNOSIS — M53.2X6 LUMBAR SPINE INSTABILITY: ICD-10-CM

## 2025-02-23 DIAGNOSIS — M48.061 LUMBAR FORAMINAL STENOSIS: ICD-10-CM

## 2025-02-24 NOTE — TELEPHONE ENCOUNTER
Notified patient via MyChart of physiatry referral and how to contact their office for an appointment.

## 2025-02-24 NOTE — TELEPHONE ENCOUNTER
Noted that patient has messaged, asking for recommendations in undergoing another injection.     Patient states that:    -in 2023 she experienced hip pain.   -another provider performed an injection after reviewing imaging.   -she felt improvement in her pain level after the injection.   -that doctor is no longer in her network and the pain has returned.   -would CHAVA Loo be able to assist her in obtaining additional injection(s)?    Per Dr. Saravia at office visit on 12.12.24:    \"Plan:  Physical Therapy Adjustment:  - Modify exercises to avoid repetitive lumbar extension and high-shear loading.  - Focus on core stabilization, neutral spine strengthening, and hip-hinge strategies rather than spine flexion/extension movements.     Lifestyle and Activity Modification:  - Encouraged continued low-impact aerobic exercise (e.g., cycling) and upper body strengthening without spinal loading.  - Maintain adequate protein intake and resistance training to preserve muscle mass and overall conditioning.     Imaging (If Symptoms Worsen):  - Obtain a standing scoliosis X-ray if considering surgical planning to assess spinal-pelvic alignment and guide potential fusion strategy.     Follow-Up:  - Routine follow-up in 3-6 months unless symptoms significantly worsen.  - Patient may contact the office sooner if new or concerning symptoms arise (e.g., bowel/bladder dysfunction, progressive weakness, or severe unremitting pain).\"    Routed to CHAVA Loo.

## 2025-02-28 PROBLEM — N60.91 ATYPICAL LOBULAR HYPERPLASIA (ALH) OF RIGHT BREAST: Status: ACTIVE | Noted: 2025-02-28

## 2025-03-24 ENCOUNTER — OFFICE VISIT (OUTPATIENT)
Dept: PHYSICAL MEDICINE AND REHAB | Facility: CLINIC | Age: 65
End: 2025-03-24
Payer: COMMERCIAL

## 2025-03-24 VITALS — BODY MASS INDEX: 24.91 KG/M2 | WEIGHT: 155 LBS | HEIGHT: 66 IN

## 2025-03-24 DIAGNOSIS — M54.16 LUMBAR RADICULOPATHY: Primary | ICD-10-CM

## 2025-03-24 DIAGNOSIS — M47.816 LUMBAR SPONDYLOSIS: ICD-10-CM

## 2025-03-24 DIAGNOSIS — M54.50 MYOFASCIAL LOW BACK PAIN: ICD-10-CM

## 2025-03-24 PROCEDURE — 99205 OFFICE O/P NEW HI 60 MIN: CPT | Performed by: PHYSICAL MEDICINE & REHABILITATION

## 2025-03-24 PROCEDURE — 3008F BODY MASS INDEX DOCD: CPT | Performed by: PHYSICAL MEDICINE & REHABILITATION

## 2025-03-24 NOTE — PROGRESS NOTES
NEW PATIENT VISIT    CHIEF COMPLAINT  Low back pain  Hip pain    HISTORY OF PRESENTING ILLNESS    Alice Louis is a 64 year old female who presents for evaluation of low back pain and hip pain.  Patient is referred to my office in neurosurgery with complaints of bilateral buttock and hamstring pain without numbness and tingling or radiation of symptoms, currently rates level pain 1/10 now.  Has imaging completed and completed physical therapy recently, onset of symptoms was around a year ago.  Previously had a hip injection with some relief of her symptoms.  Denies medication currently.    Has an MRI on file which is reviewed in full below.    She did some PT spring of last year mostly for her hip, but noted some pain traveling down both legs to the back of the knees. She states that this has been pretty inconsistent and intermittent. She was diagnosed with tendinitis of this hip and did an injection on the right side.     She was then seen by Neurosurgery and there was recommendation for surgery for her listhesis. She was again in PT over the winter and has been doing mostly home exercises.     Pain with sit to stand or stand to sit. She has pain with extension. Twisting bothers her as well.     PAST MEDICAL HISTORY  Past Medical History:    Family history of high cholesterol    PONV (postoperative nausea and vomiting)       PAST SURGICAL HISTORY  Past Surgical History:   Procedure Laterality Date    D & c      D&c after delivery N/A     Foot surgery N/A 2008    Laparoscopy,diagnostic      Needle biopsy right      around 2010       MEDICATIONS  Medications Ordered Prior to Encounter[1]    ALLERGIES  Allergies[2]    SOCIAL HISTORY   reports that she has quit smoking. Her smoking use included cigarettes. She has never used smokeless tobacco. She reports current alcohol use. She reports that she does not use drugs.    FAMILY HISTORY  Family History   Problem Relation Age of Onset    Cancer Father 48         metastatic neuroendocrine tumor    Hypertension Father     Stroke Mother     Cancer Mother 65        breast    Hypertension Mother     Breast Cancer Mother         65    Cancer Paternal Grandmother 80        ?CRC-had colostomy    Genetic Disease Sister         CHEK2 low penetrance mutation ([employer])       REVIEW OF SYSTEMS  Complete review of systems was performed and was negative except for those items stated in the History of Presenting Illness and Past Medical/Surgical History.    PHYSICAL EXAMINATION  GENERAL:  In no acute distress. Well-developed and well nourished.   SKIN: No rashes or open wounds involving posterior torso, posterior pelvis, lower extremities.  NEUROLOGIC:   Strength: 4/5 hip flexion and right toe extension otherwise 5/5, knee extension, knee flexion, ankle dorsiflexion, ankle eversion, ankle inversion, ankle plantarflexion, and great toe extension.   Sensation: intact light touch sensation throughout both lower extremities.   Reflexes: intact and symmetric in bilateral lower extremities. Babinski downgoing bilaterally. No clonus.   Gait: able to heel walk, toe walk, and perform tandem gait.   MUSCULOSKELETAL:  SLR and Slump sit negative.   Positive ROSELYN on the right.   Facet loading was positive, but no pain in the legs with this.     REVIEW OF PRIOR X-RAYS/STUDIES  Independently reviewed the MRI of the lumbar spine which reveals anterolisthesis of L4 and L5 which results in uncovering of the intervertebral disc which results in moderate to severe central canal and severe bilateral foraminal narrowing.  She also has gapped facet joints at L3-4 with facet synovitis noted.    IMPRESSION/DIAGNOSIS  Encounter Diagnoses   Name Primary?    Lumbar radiculopathy Yes    Lumbar spondylosis     Myofascial low back pain        TREATMENT/PLAN    Certainly could consider lumbar intervention. She would like to try PT first.     Would consider L5/S1 ILESI prior to addressing facet synovitis.     Education  was provided regarding the above impression/diagnosis and treatment options/plan were discussed.  All questions were answered during today's visit.  Patient will contact clinic if any other questions or concerns.      Total time spent on this office visit 64 minutes: 8 minutes reviewing records and interpreting tests, 45 minutes face to face with the patient, 2 minutes placing orders / referrals / coordinating care, 8 minutes documenting        Rhett David DO  Interventional Spine and Sports Medicine Specialist   Physical Medicine and Rehabilitation  [facility]  [address] [place] [id number]    [facility]  [address]. [address] [place] [address]                 [1]   Current Outpatient Medications on File Prior to Visit   Medication Sig Dispense Refill    LORazepam 0.5 MG Oral Tab Take one 60 minutes prior to MRI and may have second dose if needed 30 minutes prior to MRI.  Will need a . 2 tablet 0    diazePAM 5 MG Oral Tab Take 1 tablet (5 mg total) by mouth as needed for Anxiety (for dental procedures and mris).      estradiol 0.025 MG/24HR Transdermal Patch Biweekly Place 1 patch onto the skin twice a week.      progesterone 100 MG Oral Cap       estradiol 0.1 MG/GM Vaginal Cream PLACE 1 GRAM VAGINALLY 2 DAYS A WEEK      progesterone 200 MG Oral Cap       Multiple Vitamins-Minerals (MULTIVITAMIN ADULTS 50+ OR) Take by mouth daily.      rosuvastatin 5 MG Oral Tab Take 1 tablet (5 mg total) by mouth nightly.       No current facility-administered medications on file prior to visit.   [2] No Known Allergies

## 2025-04-24 ENCOUNTER — EKG ENCOUNTER (OUTPATIENT)
Dept: LAB | Age: 65
End: 2025-04-24
Attending: INTERNAL MEDICINE
Payer: COMMERCIAL

## 2025-04-24 ENCOUNTER — OFFICE VISIT (OUTPATIENT)
Dept: INTERNAL MEDICINE CLINIC | Facility: CLINIC | Age: 65
End: 2025-04-24
Payer: COMMERCIAL

## 2025-04-24 ENCOUNTER — LAB ENCOUNTER (OUTPATIENT)
Dept: LAB | Age: 65
End: 2025-04-24
Attending: INTERNAL MEDICINE
Payer: COMMERCIAL

## 2025-04-24 VITALS
BODY MASS INDEX: 24.89 KG/M2 | DIASTOLIC BLOOD PRESSURE: 60 MMHG | WEIGHT: 151.19 LBS | HEIGHT: 65.3 IN | SYSTOLIC BLOOD PRESSURE: 120 MMHG | HEART RATE: 55 BPM | OXYGEN SATURATION: 99 % | TEMPERATURE: 98 F

## 2025-04-24 DIAGNOSIS — Z80.3 FAMILY HISTORY OF BREAST CANCER IN FIRST DEGREE RELATIVE: ICD-10-CM

## 2025-04-24 DIAGNOSIS — R73.03 PREDIABETES: ICD-10-CM

## 2025-04-24 DIAGNOSIS — Z01.89 ROUTINE LAB DRAW: ICD-10-CM

## 2025-04-24 DIAGNOSIS — Z00.00 ANNUAL PHYSICAL EXAM: ICD-10-CM

## 2025-04-24 DIAGNOSIS — R00.1 BRADYCARDIA: ICD-10-CM

## 2025-04-24 DIAGNOSIS — N60.91 ATYPICAL LOBULAR HYPERPLASIA (ALH) OF RIGHT BREAST: ICD-10-CM

## 2025-04-24 DIAGNOSIS — E78.5 HYPERLIPIDEMIA, UNSPECIFIED HYPERLIPIDEMIA TYPE: ICD-10-CM

## 2025-04-24 DIAGNOSIS — Z12.11 SCREENING FOR COLON CANCER: ICD-10-CM

## 2025-04-24 DIAGNOSIS — Z00.00 ANNUAL PHYSICAL EXAM: Primary | ICD-10-CM

## 2025-04-24 LAB
ALBUMIN SERPL-MCNC: 5 G/DL (ref 3.2–4.8)
ALBUMIN/GLOB SERPL: 1.7 {RATIO} (ref 1–2)
ALP LIVER SERPL-CCNC: 100 U/L (ref 50–130)
ALT SERPL-CCNC: 16 U/L (ref 10–49)
ANION GAP SERPL CALC-SCNC: 7 MMOL/L (ref 0–18)
AST SERPL-CCNC: 25 U/L (ref ?–34)
BASOPHILS # BLD AUTO: 0.07 X10(3) UL (ref 0–0.2)
BASOPHILS NFR BLD AUTO: 0.7 %
BILIRUB SERPL-MCNC: 0.8 MG/DL (ref 0.2–1.1)
BUN BLD-MCNC: 12 MG/DL (ref 9–23)
CALCIUM BLD-MCNC: 10.1 MG/DL (ref 8.7–10.6)
CHLORIDE SERPL-SCNC: 102 MMOL/L (ref 98–112)
CHOLEST SERPL-MCNC: 228 MG/DL (ref ?–200)
CO2 SERPL-SCNC: 26 MMOL/L (ref 21–32)
CREAT BLD-MCNC: 0.87 MG/DL (ref 0.55–1.02)
EGFRCR SERPLBLD CKD-EPI 2021: 75 ML/MIN/1.73M2 (ref 60–?)
EOSINOPHIL # BLD AUTO: 0.08 X10(3) UL (ref 0–0.7)
EOSINOPHIL NFR BLD AUTO: 0.8 %
ERYTHROCYTE [DISTWIDTH] IN BLOOD BY AUTOMATED COUNT: 12 %
EST. AVERAGE GLUCOSE BLD GHB EST-MCNC: 131 MG/DL (ref 68–126)
FASTING PATIENT LIPID ANSWER: YES
FASTING STATUS PATIENT QL REPORTED: YES
GLOBULIN PLAS-MCNC: 3 G/DL (ref 2–3.5)
GLUCOSE BLD-MCNC: 100 MG/DL (ref 70–99)
HBA1C MFR BLD: 6.2 % (ref ?–5.7)
HCT VFR BLD AUTO: 46.9 % (ref 35–48)
HDLC SERPL-MCNC: 66 MG/DL (ref 40–59)
HGB BLD-MCNC: 15.2 G/DL (ref 12–16)
IMM GRANULOCYTES # BLD AUTO: 0.03 X10(3) UL (ref 0–1)
IMM GRANULOCYTES NFR BLD: 0.3 %
LDLC SERPL CALC-MCNC: 125 MG/DL (ref ?–100)
LYMPHOCYTES # BLD AUTO: 2.1 X10(3) UL (ref 1–4)
LYMPHOCYTES NFR BLD AUTO: 20.6 %
MCH RBC QN AUTO: 30.2 PG (ref 26–34)
MCHC RBC AUTO-ENTMCNC: 32.4 G/DL (ref 31–37)
MCV RBC AUTO: 93.2 FL (ref 80–100)
MONOCYTES # BLD AUTO: 0.9 X10(3) UL (ref 0.1–1)
MONOCYTES NFR BLD AUTO: 8.8 %
NEUTROPHILS # BLD AUTO: 7.01 X10 (3) UL (ref 1.5–7.7)
NEUTROPHILS # BLD AUTO: 7.01 X10(3) UL (ref 1.5–7.7)
NEUTROPHILS NFR BLD AUTO: 68.8 %
NONHDLC SERPL-MCNC: 162 MG/DL (ref ?–130)
OSMOLALITY SERPL CALC.SUM OF ELEC: 280 MOSM/KG (ref 275–295)
PLATELET # BLD AUTO: 408 10(3)UL (ref 150–450)
POTASSIUM SERPL-SCNC: 4.6 MMOL/L (ref 3.5–5.1)
PROT SERPL-MCNC: 8 G/DL (ref 5.7–8.2)
RBC # BLD AUTO: 5.03 X10(6)UL (ref 3.8–5.3)
SODIUM SERPL-SCNC: 135 MMOL/L (ref 136–145)
TRIGL SERPL-MCNC: 211 MG/DL (ref 30–149)
TSI SER-ACNC: 0.85 UIU/ML (ref 0.55–4.78)
VIT D+METAB SERPL-MCNC: 41.5 NG/ML (ref 30–100)
VLDLC SERPL CALC-MCNC: 38 MG/DL (ref 0–30)
WBC # BLD AUTO: 10.2 X10(3) UL (ref 4–11)

## 2025-04-24 PROCEDURE — 36415 COLL VENOUS BLD VENIPUNCTURE: CPT

## 2025-04-24 PROCEDURE — 80061 LIPID PANEL: CPT

## 2025-04-24 PROCEDURE — 93005 ELECTROCARDIOGRAM TRACING: CPT

## 2025-04-24 PROCEDURE — 80053 COMPREHEN METABOLIC PANEL: CPT

## 2025-04-24 PROCEDURE — 84443 ASSAY THYROID STIM HORMONE: CPT

## 2025-04-24 PROCEDURE — 83036 HEMOGLOBIN GLYCOSYLATED A1C: CPT

## 2025-04-24 PROCEDURE — 93010 ELECTROCARDIOGRAM REPORT: CPT | Performed by: INTERNAL MEDICINE

## 2025-04-24 PROCEDURE — 85025 COMPLETE CBC W/AUTO DIFF WBC: CPT

## 2025-04-24 PROCEDURE — 82306 VITAMIN D 25 HYDROXY: CPT

## 2025-04-24 NOTE — H&P
Subjective:   Alice Louis is a 65 year old female  who presents for Physical (Reviewed Preventative/Wellness form with patient. )       The following individual(s) verbally consented to be recorded using ambient AI listening technology and understand that they can each withdraw their consent to this listening technology at any point by asking the clinician to turn off or pause the recording:    Patient name: Alice Louis        History of Present Illness  The patient, with a history of prediabetes and high cholesterol, presents for a routine check-up. She expresses concern about her blood work, particularly her cholesterol and blood sugar levels. She reports taking rosuvastatin inconsistently for her high cholesterol and is worried about her blood sugar levels, which have previously been in the prediabetic range. She reports no significant lifestyle changes that could affect these levels.-reports being active and monitoring diet.   Denies cp/sob/angina    The patient also has a history of ALH  and is under the care of a breast specialist for this condition. She recently had an MRI and is due for a follow-up MRI in July. To have close follow-up.       The patient is due for routine screenings and vaccinations. She had a colonoscopy about ten years ago and is due for another one. She also needs a tetanus booster, as her last one was ten years ago. She is due for a pneumonia vaccine as well. The patient reports having regular eye exams and gynecological exams.    History/Other:    Chief Complaint Reviewed and Verified  Nursing Notes Reviewed and   Verified  Tobacco Reviewed  Allergies Reviewed  Medications Reviewed    Medical History Reviewed  Surgical History Reviewed  OB Status Reviewed    Family History Reviewed  Social History Reviewed         Current Medications[1]    Review of Systems:  Pertinent items are noted in HPI.  A comprehensive 10 point review of systems was completed.   Pertinent positives and negatives noted in the the HPI.        Objective:   /60 (BP Location: Left arm, Patient Position: Sitting, Cuff Size: adult)   Pulse 55   Temp 97.7 °F (36.5 °C) (Temporal)   Ht 5' 5.3\" (1.659 m)   Wt 151 lb 3.2 oz (68.6 kg)   SpO2 99%   BMI 24.93 kg/m²  Estimated body mass index is 24.93 kg/m² as calculated from the following:    Height as of this encounter: 5' 5.3\" (1.659 m).    Weight as of this encounter: 151 lb 3.2 oz (68.6 kg).  PHYSICAL EXAM:   General: no acute distress   Eyes: pupils equal and reactive; EOMI; sclera normal; conjunctiva normal   ENT: without erythema or exudate  Neck: trachea midline; no adenopathy; thyroid not enlarged   Hematologic/lymphatic:no cervical lymphadenopathy; no supraclavicular adenopathy   Respiratory: no increased work of breathing; good air exchange; CTAB; no crackles or wheezing   Cardiovascular: RRR; S1, S2; no murmurs; no carotid bruits; no edema   Gastrointestinal: normal bowel sounds; soft; non-distended; non-tender  Neurological: awake, alert, oriented x3; CNII-XII grossly intact;  MSK: full ROM; strength 5/5  Behavioral/Psych: euthymic; appropriate affect     Physical Exam      Assessment & Plan:     Assessment & Plan    Reviewed screenings, preventive care, and immunizations. Colonoscopy needed. Emphasized specialist follow-up.  - Order routine blood work including lipid panel and A1c.  - Recommend colonoscopy.  - Provide colonoscopy referral.  - Recommend Tdap booster.  - Recommend Prevnar 20 vaccine.  -breast specialist follow-up.  Prediabetes  - Order A1c test.  - open to starting metformin.   -follow-up     Hyperlipidemia  Has not been taking rosuvastatin daily Emphasized adherence for accurate assessment.  - statin and labs   -EKG          This note was created utilizing [employer] speech recognition software which may lead to grammatical errors/typos.   If any word or phrase is confusing, it is likely due to recognition error. Please  ask the provider for clarification.      Elizabeth Roberts MD         [1]   Current Outpatient Medications   Medication Sig Dispense Refill    CALCIUM ACETATE OR       estradiol 0.025 MG/24HR Transdermal Patch Biweekly Place 1 patch onto the skin twice a week.      progesterone 100 MG Oral Cap       Multiple Vitamins-Minerals (MULTIVITAMIN ADULTS 50+ OR) Take by mouth daily.      rosuvastatin 5 MG Oral Tab Take 1 tablet (5 mg total) by mouth nightly.

## 2025-04-25 LAB
ATRIAL RATE: 55 BPM
P AXIS: 54 DEGREES
P-R INTERVAL: 174 MS
Q-T INTERVAL: 410 MS
QRS DURATION: 84 MS
QTC CALCULATION (BEZET): 392 MS
R AXIS: 61 DEGREES
T AXIS: 27 DEGREES
VENTRICULAR RATE: 55 BPM

## 2025-05-02 ENCOUNTER — ORDER TRANSCRIPTION (OUTPATIENT)
Dept: ADMINISTRATIVE | Facility: HOSPITAL | Age: 65
End: 2025-05-02

## 2025-05-02 DIAGNOSIS — E78.5 HYPERLIPIDEMIA: Primary | ICD-10-CM

## 2025-05-29 ENCOUNTER — HOSPITAL ENCOUNTER (OUTPATIENT)
Dept: NUTRITION | Facility: HOSPITAL | Age: 65
Discharge: HOME | End: 2025-05-29
Attending: INTERNAL MEDICINE
Payer: COMMERCIAL

## 2025-05-29 VITALS — HEIGHT: 65.5 IN | WEIGHT: 149.69 LBS | BODY MASS INDEX: 24.64 KG/M2

## 2025-05-29 DIAGNOSIS — R73.03 PREDIABETES: Primary | ICD-10-CM

## 2025-05-29 PROCEDURE — 97802 MEDICAL NUTRITION INDIV IN: CPT

## 2025-05-29 NOTE — PROGRESS NOTES
Nutrition Assessment    Alice Louis is a 65 year old female.    Referred by: Attending  Referring Physician Name: Elizabeth Roberts    Spent 60minutes with pateint     Assessment     Medical Nutrition Therapy Comment: hyperlipidemia and pre diabetes hgb A1c 6.2 %  Visit Information: Initial Visit 5/29/25    ANTHROPOMETRICS  HT: 5' 5.5\" (166.4 cm)  WT: 149 lbs (67.9 kg )  Weight Loss: Yes, lost 6 lbs over past 2 months    IBW: 58  kg/128 lbs    BMI: 24.53 kg/m2  BMI CLASSIFICATION: 19-24.9 kg/m2 - WNL      SIGNIFICANT MEDICAL Hx  Significant Past Medical Hx: noted  Pertinent Medications: Medications - Current[1]  Pertinent Lab Results: A1c 6.2% , total chol 228, ,     DIET HISTORY  Number of meals per day: 3  Number of snacks per day: 3    Comment: Alice is here to discuss her diet in regards to elevated hgb A1C of >6% for past 2 years.  She is very active hiking, biking, tennis, and gym daily. She had cut back on carbs but still had most recent A1c of 6.2%.  She is always hungry and has cravings for sugar a lot.  She typically grazes in morning from 8am to 10 or 11 am on eggs, gomez or oatmeal with brown sugar and then will have yogurt and then [employer] high protein milk and then will do the physical activity and eat dinner around 4 pm and then snacks again a night.   We discussed having balanced meals for glucose control as well as satisfying her appetite.  We discussed set meals and snacks inplace of grazing.      PHYSICAL ACTIVITY  Type: walk, jog, bike, run, and other: tennis  Duration:  minutes  Frequency: per day      Nutrition Diagnosis: food and nutrition knowledge deficit related to healthy eating as evident elevated hgb A1C of 6.2% pre diabetes    Intervention     Nutrition Education: Recommended Modification  Nutrition/Diet Handouts Given: Diabetes Handout:  Other myplate for diabetes and carb counting      NUTRITION PRESCRIPTION:      Needs:  1700 Calorie     80-90 grams   Protein      Oral Diet Prescription: General ([facility])      Goals     Nutrition Goals: 3meals and 2 -3 snacks per day  Each meal with up to 45 grams of carbs mostly from whole foods of whole grains, starchy vegetables and fruit, and whole food protein source (beans, lentils with healthy fat like avocado, meat, poultry or fish, eggs, nuts/seeds or nut butters), and non starchy vegetables  Each snack with up to 20 grams carbs with protein /fat (cheese or nut/seeds )  Eat meal or snack every 3-4 hours     Recommendation to MD: recheck hgb A1C    Assessment of Ability/Barriers     Patient and/or Family Will: Verbalize Understanding    Patient and/or Family Ability to Learn: Retain Information    Readiness to Learn: Motivated    Barriers to Learning: None        5/29/2025  Marilee Villa RD       [1]   Current Outpatient Medications:     rosuvastatin 5 MG Oral Tab, Take 1 tablet (5 mg total) by mouth nightly., Disp: 90 tablet, Rfl: 0    metFORMIN  MG Oral Tablet 24 Hr, Take 1 tablet (500 mg total) by mouth daily with breakfast., Disp: 90 tablet, Rfl: 0    CALCIUM ACETATE OR, , Disp: , Rfl:     estradiol 0.025 MG/24HR Transdermal Patch Biweekly, Place 1 patch onto the skin twice a week., Disp: , Rfl:     progesterone 100 MG Oral Cap, , Disp: , Rfl:     Multiple Vitamins-Minerals (MULTIVITAMIN ADULTS 50+ OR), Take by mouth daily., Disp: , Rfl:

## 2025-08-10 DIAGNOSIS — E78.5 HYPERLIPIDEMIA, UNSPECIFIED HYPERLIPIDEMIA TYPE: ICD-10-CM

## 2025-08-11 RX ORDER — ROSUVASTATIN CALCIUM 5 MG/1
5 TABLET, COATED ORAL NIGHTLY
Qty: 90 TABLET | Refills: 0 | Status: SHIPPED | OUTPATIENT
Start: 2025-08-11

## (undated) DEVICE — SET TUBI Y FL CNTRL INFL/OTFL

## (undated) DEVICE — MYOSURE SINGLE USE SEAL SET

## (undated) DEVICE — HYSTEROSCOPY: Brand: MEDLINE INDUSTRIES, INC.

## (undated) DEVICE — COVER SGL STRL LGHT HNDL BLU

## (undated) DEVICE — SOCK CNSTR 4IN TNPSL UNV SPEC

## (undated) DEVICE — STERILE LATEX POWDER-FREE SURGICAL GLOVESWITH NITRILE COATING: Brand: PROTEXIS

## (undated) DEVICE — SOL  .9 3000ML

## (undated) DEVICE — DEVICE SPEC RTRVL MYOSURE

## (undated) NOTE — LETTER
AUTHORIZATION FOR SURGICAL OPERATION OR OTHER PROCEDURE    1.  I hereby authorize Dr. Jaquelin Negron, and [facility] staff assigned to my case to perform the following operation and/or procedure at the [facility]:    _IUD removal ___________________ Patient Name:  ______________________________________________________  (please print)      Patient signature:  ___________________________________________________             Relationship to Patient:           []  Parent    Responsible person

## (undated) NOTE — ED AVS SNAPSHOT
Lokesh Garcia   MRN: [id number]    Department:  [facility] Emergency Department   Date of Visit:  10/6/2018           Disclosure     Insurance plans vary and the physician(s) referred by the ER may not be covered by your plan.  Please con CARE PHYSICIAN AT ONCE OR RETURN IMMEDIATELY TO THE EMERGENCY DEPARTMENT. If you have been prescribed any medication(s), please fill your prescription right away and begin taking the medication(s) as directed.   If you believe that any of the medications

## (undated) NOTE — LETTER
1/11/2022              [address] [place] [id number]         Dear Penny Davenport,    It was a pleasure to see you. Your pap smear was normal.  There is no need for further testing at this time.   I look forward to s

## (undated) NOTE — ED AVS SNAPSHOT
Maryjane Dose   MRN: [id number]    Department:  [facility] Emergency Department   Date of Visit:  5/14/2018           Disclosure     Insurance plans vary and the physician(s) referred by the ER may not be covered by your plan.  Please con CARE PHYSICIAN AT ONCE OR RETURN IMMEDIATELY TO THE EMERGENCY DEPARTMENT. If you have been prescribed any medication(s), please fill your prescription right away and begin taking the medication(s) as directed.   If you believe that any of the medications

## (undated) NOTE — LETTER
AUTHORIZATION FOR SURGICAL OPERATION OR OTHER PROCEDURE    1.  I hereby authorize Dr. Zhao Meza, and [facility] staff assigned to my case to perform the following operation and/or procedure at the [facility]:      [facility] IUD REMOVAL Relationship to Patient:           []  Parent    Responsible person                          []  Spouse  In case of minor or                    [] Other  _____________   Incompetent name:  __________________________________________________

## (undated) NOTE — LETTER
5/8/2019              [address] [facility] [address]         Dear Meena Garcia,    It was a pleasure to see you. Your PAP test was normal.  There is no need for further testing at this time.   I look forward to seeing

## (undated) NOTE — LETTER
[place] [address], [place], [place]    Authorization for Surgical Operation and Procedure                               I hereby authorize                                       , my physician and his/her assistants (if applicable), which may include medical students, residents, and/or fellows, to perform the following surgical operation/ procedure and administer such anesthesia as may be determined necessary by my physician: MAGNETIC RESONANCE IMAGE GUIDED RIGHT BREAST BIOPSY, TWO SITES WITH CLIP PLACEMENT TO EACH SITE  on Alice Louis   2.   I recognize that during the surgical operation/procedure, unforeseen conditions may necessitate additional or different procedures than those listed above.  I, therefore, further authorize and request that the above-named surgeon, assistants, or designees perform such procedures as are, in their judgment, necessary and desirable.    3.   My surgeon/physician has discussed prior to my surgery the potential benefits, risks and side effects of this procedure; the likelihood of achieving goals; and potential problems that might occur during recuperation.  They also discussed reasonable alternatives to the procedure, including risks, benefits, and side effects related to the alternatives and risks related to not receiving this procedure.  I have had all my questions answered and I acknowledge that no guarantee has been made as to the result that may be obtained.    4.   Should the need arise during my operation/procedure, which includes change of level of care prior to discharge, I also consent to the administration of blood and/or blood products.  Further, I understand that despite careful testing and screening of blood or blood products by collecting agencies, I may still be subject to ill effects as a result of receiving a blood transfusion and/or blood products.  The following are some, but not all, of the potential risks that can occur:  fever and allergic reactions, hemolytic reactions, transmission of diseases such as Hepatitis, AIDS and Cytomegalovirus (CMV) and fluid overload.  In the event that I wish to have an autologous transfusion of my own blood, or a directed donor transfusion, I will discuss this with my physician.  Check only if Refusing Blood or Blood Products  I understand refusal of blood or blood products as deemed necessary by my physician may have serious consequences to my condition to include possible death. I hereby assume responsibility for my refusal and release the hospital, its personnel, and my physicians from any responsibility for the consequences of my refusal.    o  Refuse   5.   I authorize the use of any specimen, organs, tissues, body parts or foreign objects that may be removed from my body during the operation/procedure for diagnosis, research or teaching purposes and their subsequent disposal by hospital authorities.  I also authorize the release of specimen test results and/or written reports to my treating physician on the hospital medical staff or other referring or consulting physicians involved in my care, at the discretion of the Pathologist or my treating physician.    6.   I consent to the photographing or videotaping of the operations or procedures to be performed, including appropriate portions of my body for medical, scientific, or educational purposes, provided my identity is not revealed by the pictures or by descriptive texts accompanying them.  If the procedure has been photographed/videotaped, the surgeon will obtain the original picture, image, videotape or CD.  The hospital will not be responsible for storage, release or maintenance of the picture, image, tape or CD.    7.   I consent to the presence of a  or observers in the operating room as deemed necessary by my physician or their designees.    8.   I recognize that in the event my procedure results in extended  X-Ray/fluoroscopy time, I may develop a skin reaction.    9. If I have a Do Not Attempt Resuscitation (DNAR) order in place, that status will be suspended while in the operating room, procedural suite, and during the recovery period unless otherwise explicitly stated by me (or a person authorized to consent on my behalf). The surgeon or my attending physician will determine when the applicable recovery period ends for purposes of reinstating the DNAR order.  10. Patients having a sterilization procedure: I understand that if the procedure is successful the results will be permanent and it will therefore be impossible for me to inseminate, conceive, or bear children.  I also understand that the procedure is intended to result in sterility, although the result has not been guaranteed.   11. I acknowledge that my physician has explained sedation/analgesia administration to me including the risk and benefits I consent to the administration of sedation/analgesia as may be necessary or desirable in the judgment of my physician.    I CERTIFY THAT I HAVE READ AND FULLY UNDERSTAND THE ABOVE CONSENT TO OPERATION and/or OTHER PROCEDURE.     ____________________________________  _________________________________        ______________________________  Signature of Patient    Signature of Responsible Person                Printed Name of Responsible Person                                      ____________________________________  _____________________________                ________________________________  Signature of Witness        Date  Time         Relationship to Patient    STATEMENT OF PHYSICIAN My signature below affirms that prior to the time of the procedure; I have explained to the patient and/or his/her legal representative, the risks and benefits involved in the proposed treatment and any reasonable alternative to the proposed treatment. I have also explained the risks and benefits involved in refusal of the proposed  treatment and alternatives to the proposed treatment and have answered the patient's questions. If I have a significant financial interest in a co-management agreement or a significant financial interest in any product or implant, or other significant relationship used in this procedure/surgery, I have disclosed this and had a discussion with my patient.     _____________________________________________________              _____________________________  (Signature of Physician)                                                                                         (Date)                                   (Time)  Patient Name: Alice DOMINIQUE Louis      : 3/28/1960      Printed: 2025     Medical Record #: [id number]                                      Page 1 of 1